# Patient Record
Sex: MALE | Race: WHITE | Employment: UNEMPLOYED | URBAN - METROPOLITAN AREA
[De-identification: names, ages, dates, MRNs, and addresses within clinical notes are randomized per-mention and may not be internally consistent; named-entity substitution may affect disease eponyms.]

---

## 2019-02-01 ENCOUNTER — OFFICE VISIT (OUTPATIENT)
Dept: NEUROSURGERY | Age: 44
End: 2019-02-01
Payer: COMMERCIAL

## 2019-02-01 VITALS
SYSTOLIC BLOOD PRESSURE: 142 MMHG | DIASTOLIC BLOOD PRESSURE: 87 MMHG | HEIGHT: 71 IN | HEART RATE: 92 BPM | WEIGHT: 237 LBS | BODY MASS INDEX: 33.18 KG/M2

## 2019-02-01 DIAGNOSIS — M54.41 CHRONIC MIDLINE LOW BACK PAIN WITH BILATERAL SCIATICA: Primary | ICD-10-CM

## 2019-02-01 DIAGNOSIS — G89.29 CHRONIC MIDLINE LOW BACK PAIN WITH BILATERAL SCIATICA: Primary | ICD-10-CM

## 2019-02-01 DIAGNOSIS — M54.42 CHRONIC MIDLINE LOW BACK PAIN WITH BILATERAL SCIATICA: Primary | ICD-10-CM

## 2019-02-01 PROCEDURE — 99203 OFFICE O/P NEW LOW 30 MIN: CPT | Performed by: NEUROLOGICAL SURGERY

## 2019-02-01 ASSESSMENT — ENCOUNTER SYMPTOMS
EYES NEGATIVE: 1
RESPIRATORY NEGATIVE: 1
BOWEL INCONTINENCE: 0
BACK PAIN: 1
GASTROINTESTINAL NEGATIVE: 1
ABDOMINAL PAIN: 0

## 2019-02-27 ENCOUNTER — OFFICE VISIT (OUTPATIENT)
Dept: PAIN MANAGEMENT | Age: 44
End: 2019-02-27
Payer: COMMERCIAL

## 2019-02-27 ENCOUNTER — PREP FOR PROCEDURE (OUTPATIENT)
Dept: PAIN MANAGEMENT | Age: 44
End: 2019-02-27

## 2019-02-27 VITALS
RESPIRATION RATE: 16 BRPM | WEIGHT: 225 LBS | DIASTOLIC BLOOD PRESSURE: 90 MMHG | HEIGHT: 71 IN | BODY MASS INDEX: 31.5 KG/M2 | HEART RATE: 88 BPM | SYSTOLIC BLOOD PRESSURE: 128 MMHG | OXYGEN SATURATION: 98 %

## 2019-02-27 DIAGNOSIS — M43.16 SPONDYLOLISTHESIS OF LUMBAR REGION: Primary | ICD-10-CM

## 2019-02-27 PROCEDURE — 99204 OFFICE O/P NEW MOD 45 MIN: CPT | Performed by: PAIN MEDICINE

## 2019-03-21 ENCOUNTER — HOSPITAL ENCOUNTER (OUTPATIENT)
Dept: OPERATING ROOM | Age: 44
Discharge: HOME | End: 2019-03-21
Payer: COMMERCIAL

## 2019-03-21 ENCOUNTER — HOSPITAL ENCOUNTER (OUTPATIENT)
Age: 44
Setting detail: OUTPATIENT SURGERY
Discharge: HOME | End: 2019-03-21
Attending: PAIN MEDICINE | Admitting: PAIN MEDICINE
Payer: COMMERCIAL

## 2019-03-21 VITALS
HEART RATE: 76 BPM | OXYGEN SATURATION: 98 % | DIASTOLIC BLOOD PRESSURE: 86 MMHG | RESPIRATION RATE: 14 BRPM | SYSTOLIC BLOOD PRESSURE: 148 MMHG

## 2019-03-21 DIAGNOSIS — M51.36 DDD (DEGENERATIVE DISC DISEASE), LUMBAR: ICD-10-CM

## 2019-03-21 PROCEDURE — 3209999900 FLUORO FOR SURGICAL PROCEDURES

## 2019-03-21 PROCEDURE — 2500000003 HC RX 250 WO HCPCS: Performed by: PAIN MEDICINE

## 2019-03-21 PROCEDURE — 64484 NJX AA&/STRD TFRM EPI L/S EA: CPT | Performed by: PAIN MEDICINE

## 2019-03-21 PROCEDURE — 6360000002 HC RX W HCPCS: Performed by: PAIN MEDICINE

## 2019-03-21 PROCEDURE — 6360000004 HC RX CONTRAST MEDICATION: Performed by: PAIN MEDICINE

## 2019-03-21 PROCEDURE — 3600000005 HC SURGERY LEVEL 5 BASE: Performed by: PAIN MEDICINE

## 2019-03-21 PROCEDURE — 7100000010 HC PHASE II RECOVERY - FIRST 15 MIN: Performed by: PAIN MEDICINE

## 2019-03-21 PROCEDURE — 2709999900 HC NON-CHARGEABLE SUPPLY: Performed by: PAIN MEDICINE

## 2019-03-21 PROCEDURE — 64483 NJX AA&/STRD TFRM EPI L/S 1: CPT | Performed by: PAIN MEDICINE

## 2019-03-21 RX ORDER — LIDOCAINE HYDROCHLORIDE 5 MG/ML
INJECTION, SOLUTION INFILTRATION; INTRAVENOUS PRN
Status: DISCONTINUED | OUTPATIENT
Start: 2019-03-21 | End: 2019-03-21 | Stop reason: ALTCHOICE

## 2019-03-21 ASSESSMENT — PAIN SCALES - GENERAL
PAINLEVEL_OUTOF10: 0
PAINLEVEL_OUTOF10: 0

## 2019-03-21 ASSESSMENT — PAIN - FUNCTIONAL ASSESSMENT: PAIN_FUNCTIONAL_ASSESSMENT: 0-10

## 2019-04-01 ENCOUNTER — PREP FOR PROCEDURE (OUTPATIENT)
Dept: PAIN MANAGEMENT | Age: 44
End: 2019-04-01

## 2019-04-01 ENCOUNTER — OFFICE VISIT (OUTPATIENT)
Dept: PAIN MANAGEMENT | Age: 44
End: 2019-04-01
Payer: COMMERCIAL

## 2019-04-01 VITALS
WEIGHT: 225 LBS | TEMPERATURE: 97.6 F | RESPIRATION RATE: 16 BRPM | DIASTOLIC BLOOD PRESSURE: 80 MMHG | HEIGHT: 71 IN | OXYGEN SATURATION: 97 % | BODY MASS INDEX: 31.5 KG/M2 | HEART RATE: 78 BPM | SYSTOLIC BLOOD PRESSURE: 120 MMHG

## 2019-04-01 DIAGNOSIS — M43.16 SPONDYLOLISTHESIS OF LUMBAR REGION: Primary | ICD-10-CM

## 2019-04-01 PROCEDURE — 99213 OFFICE O/P EST LOW 20 MIN: CPT | Performed by: PAIN MEDICINE

## 2019-04-01 NOTE — PROGRESS NOTES
Rose Melendez presents to the [facility] on 4/1/2019. China Stuart is complaining of pain lower lumbar The pain is constant. The pain is described as aching and throbbing. Pain is rated on his best day at a 0, on his worst day at a 7, and on average at a 5 on the VAS scale. He took his last dose of none. Any procedures since your last visit: Yes, with 80 % relief first week and then 50% now over time     He has not been on anticoagulation medications to include none and is managed by none. Pacemaker or defibrilator: No Physician managing device is none.        /80   Pulse 78   Temp 97.6 °F (36.4 °C) (Oral)   Resp 16   Ht 5' 11\" (1.803 m)   Wt 225 lb (102.1 kg)   SpO2 97%   BMI 31.38 kg/m²

## 2019-04-01 NOTE — PROGRESS NOTES
[address]  [address] [facility], [address] [place] [address] Linus  [phone]    Follow up Note      Presley Andrewgh     Date of Visit:  4/1/2019    CC:  Patient presents for follow up   Chief Complaint   Patient presents with    Follow-up     lower lumbar region     HPI:    Pain is better. Change in quality of symptoms:no. Medication side effects:not applicable . Recent diagnostic testing:none. Recent interventional procedures:Left L5 and S1 TFESI .fair. He has not been on anticoagulation medications to include none and has not been on herbal supplements. He is not diabetic.     Imaging: Lumbar spine MRI 01//2018    EMG 05/2018  Left L5 radiculopathy     Previous treatments: medications. .         Potential Aberrant Drug-Related Behavior:    No    Urine Drug Screening:  None no opioids started     OARRS report:  03/2019 consistent     No past medical history on file.     Past Surgical History:   Procedure Laterality Date    ANESTHESIA NERVE BLOCK Left 3/21/2019    LEFT L5 AND S1 TRANSFORAMINAL EPIDURAL STEROID INJECTION performed by Emeterio Lovelace MD at [facility] 97      thumb    KNEE ARTHROSCOPY      NERVE BLOCK Left 03/21/2019    lumbar transforaminal epidural     Prior to Admission medications    Not on File     No Known Allergies    Social History     Socioeconomic History    Marital status: Single     Spouse name: Not on file    Number of children: Not on file    Years of education: Not on file    Highest education level: Not on file   Occupational History    Not on file   Social Needs    Financial resource strain: Not on file    Food insecurity:     Worry: Not on file     Inability: Not on file    Transportation needs:     Medical: Not on file     Non-medical: Not on file   Tobacco Use    Smoking status: Never Smoker    Smokeless tobacco: Never Used   Substance and Sexual Activity    Alcohol use: No    Drug use: No    Sexual activity: Not on file   Lifestyle    Physical activity:     Days per week: Not on file     Minutes per session: Not on file    Stress: Not on file   Relationships    Social connections:     Talks on phone: Not on file     Gets together: Not on file     Attends Sikhism service: Not on file     Active member of club or organization: Not on file     Attends meetings of clubs or organizations: Not on file     Relationship status: Not on file    Intimate partner violence:     Fear of current or ex partner: Not on file     Emotionally abused: Not on file     Physically abused: Not on file     Forced sexual activity: Not on file   Other Topics Concern    Not on file   Social History Narrative    Not on file     Family History   Problem Relation Age of Onset    Cancer Father     Coronary Art Dis Father     Hypertension Father      REVIEW OF SYSTEMS:     Isidro Rho denies fever/chills, chest pain, shortness of breath, new bowel or bladder complaints. All other review of systems was negative. PHYSICAL EXAMINATION:      /80   Pulse 78   Temp 97.6 °F (36.4 °C) (Oral)   Resp 16   Ht 5' 11\" (1.803 m)   Wt 225 lb (102.1 kg)   SpO2 97%   BMI 31.38 kg/m²     General:       General appearance:   pleasant and well-hydrated. , in mild to moderate discomfort and A & O x3  Build:Normal Weight  Function:Rises from a seated position easily.     HEENT:     Head:normocephalic and atraumatic  Pupils:regular, round and equal.  Sclera: icterus absent,      Lungs:     Breathing:Breathing Pattern: regular, no distress     Abdomen:     Shape:non-distended and normal  Tenderness:none  Guarding:none     Lumbar spine:     Spine inspection:normal   CVA tenderness:No   Palpation:tenderness paravertebral muscles, facet loading, left, right and negative.   Range of motion:abnormal mildly Lateral bending, flexion, extension rotation bilateral and is  painful.     Musculoskeletal:     Trigger points in Paraveteral:absent bilaterally  SI joint tenderness:negative right, negative left              KRISTAL test:not done right, not done             left  Piriformis tenderness:negative right, negative left  Trochanteric bursa tenderness:negative right, negative left  SLR:negative right, negative left, sitting      Extremities:     Tremors:None bilaterally upper and lower  Range of motion pain with internal rotation of hips negative. Intact:Yes  Edema:Normal     Neurological:     Sensory:diminished to light touch Left L5 dermatome   Motor:               Right Quadriceps5/5          Left Quadriceps5/5           Right Gastrocnemius5/5    Left Gastrocnemius5/5  Right Ant Tibialis5/5  Left Ant Tibialis5/5  Reflexes:    Right Quadriceps reflex2+  Left Quadriceps reflex2+  Right Achilles reflex2+  Left Achilles reflex2+  Gait:antalgic     Dermatology:     Skin:no unusual rashes and no skin lesions     Impression:  Chronic low back pain with radiation to the Left lower extremity along the Left L5 dermatome   Patient had seen Leo Meraz and is a candidate for surgery if conservative treatment fails (L5-S1 posterior interbody fusion)  Lumbar spine MRI 01/2018  Grade 1 anterolisthesis with moderate to severe foraminal stenosis   Plan:  Follow up on his low back and Left leg pain   Patient is s/p Left L5 and S1 TFESI with fair response, discussed repeating, patient agrees  Will consider low dose Gabapentin if needed later  Hold off on urine screen, no opioids started   OARRS report reviewed  Patient encouraged to stay active, patient is scheduled to start physical therapy  Treatment plan discussed with the patient including medications and procedure side effects          We discussed with the patient that combining opioids, benzodiazepines, alcohol, illicit drugs or sleep aids increases the risk of respiratory depression including death.  We discussed that these medications may cause drowsiness, sedation or dizziness and have counseled the patient not to drive or operate machinery. We have discussed that these medications will be prescribed only by one provider. We have discussed with the patient about age related risk factors and have thoroughly discussed the importance of taking these medications as prescribed. The patient verbalizes understanding. gisell Steve M.D.

## 2019-04-04 ENCOUNTER — EVALUATION (OUTPATIENT)
Dept: PHYSICAL THERAPY | Age: 44
End: 2019-04-04
Payer: COMMERCIAL

## 2019-04-04 DIAGNOSIS — M43.16 SPONDYLOLISTHESIS OF LUMBAR REGION: Primary | ICD-10-CM

## 2019-04-04 PROCEDURE — 97163 PT EVAL HIGH COMPLEX 45 MIN: CPT | Performed by: PHYSICAL THERAPIST

## 2019-04-04 NOTE — PROGRESS NOTES
[facility] OUTPATIENT REHABILITATION  PHYSICAL THERAPY INITIAL EVALUATION         Date:  2019   Patient: Charisse Lizarraga  : 1975  MRN: [id number]  Referring Provider: Earlene Diallo MD  [address] [place] [address], [address] [place] [address], [address] [place] [address] Jackelyn S     Medical Diagnosis:   M54.41 (ICD-10-CM) - Lumbago with sciatica, right side   M54.42 (ICD-10-CM) - Lumbago with sciatica, left side   G89.29 (ICD-10-CM) - Other chronic pain     Onset date: 2017 2 episodes lifting that resolved in 2 days then 10/25/2017 episode with bob that preceded this current course of pain    Onset[de-identified] Sudden onset    Mechanism of Injury: pulling a bob up stairs with food felt a pop and pain    Chief complaint: pain across LB, bilateral buttocks and down posterior L leg into foot     SUBJECTIVE:     Pat Medical History  No past medical history on file. Past Surgical History:   Procedure Laterality Date    ANESTHESIA NERVE BLOCK Left 3/21/2019    LEFT L5 AND S1 TRANSFORAMINAL EPIDURAL STEROID INJECTION performed by Nathalie Ambrose MD at [facility] 97      thumb    KNEE ARTHROSCOPY      NERVE BLOCK Left 2019    lumbar transforaminal epidural       Medications:   No current outpatient medications on file. No current facility-administered medications for this visit. Imaging results: Fluoro For Surgical Procedures    Result Date: 3/21/2019  Radiology exam is complete. No Radiologist dictation. Please follow up with ordering provider.        Pain: constant in LB, intermittent L LE  Current: 4/10     Best: 1/10     Worst:7/10    Symptom Type/Quality: sharp, throbbing  Location[de-identified] Back: lumbar region, right lateral side and left lateral side radiates down the posterior left leg     70% LBP and 30% L LE pain    Behavior: condition is staying the same      Provoking Activities/Positions:  [x] Sitting  [] Standing  [] Walking [x] Side Lying  [] Bending  [] When still                                                                     [] On the move  [] Turning head  [] A.M.                                                                     [] P.M.  [] As day progresses [] Cough                [] Sneezing                 Relieving Activitie/Positions:      [] Sitting  [] Standing  [x] Walking                                                                     [] Lying  [] Bending  [] When still                                                                     [] On the move  [] Turning head  [] A.M.                                                                     [] P.M.  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 Physical demands include: Heavy Lifting. Status: Full Time     Exercise regimen: none    Hobbies: golf     Patient Goals: avoid surgery    Medical Management for Current Problem:  [] Chiro  []  CT  []  Injection:    [x]  Meds:   [x]  MRI:  [x]  Neurosurgery  [x]  Pain clinic  []  PM&R  [x]  PT/OT [facility]    []  X-ray:  []  Other:     Contraindications/Precautions: none    OBJECTIVE:     Inspection:  Standing    Cervical: Forward Head   [x] Normal   []Mild   [] Moderate   []Severe      Thoracic:         [x] Normal   [] Increased Kyphosis  [] Decreased Kyphosis    Lumbar:   [x] Normal   [] Increased Lordosis   [] Decreased Lordosis           Observations: well nourished male with normal affect     Gait:  Normal    Functional Strength:   Able to toe walk, heel walk, and squat.     Range of Motion:    Trunk:   Flexion:  [x] Normal   [] Limited    Extension:  [] Normal   [x] Limited 25% stiff    Right Side Bending: [x] Normal   [] Limited stiff and sore   Left Side Bending: [x] Normal   [] Limited stiff and sore    Lower

## 2019-04-05 ENCOUNTER — TREATMENT (OUTPATIENT)
Dept: PHYSICAL THERAPY | Age: 44
End: 2019-04-05
Payer: COMMERCIAL

## 2019-04-05 DIAGNOSIS — M43.16 SPONDYLOLISTHESIS OF LUMBAR REGION: Primary | ICD-10-CM

## 2019-04-05 PROCEDURE — 97530 THERAPEUTIC ACTIVITIES: CPT

## 2019-04-05 PROCEDURE — G0283 ELEC STIM OTHER THAN WOUND: HCPCS

## 2019-04-05 PROCEDURE — 97110 THERAPEUTIC EXERCISES: CPT

## 2019-04-05 NOTE — PROGRESS NOTES
Physical Therapy Treatment Note    Date: 2019  Patient Name: Ama Amato  : 1975   MRN: [id number]  Referring Provider: Mary Geller MD  [address] [facility], [facility], [address] [facility] [address]                             Medical Diagnosis:   M54.41 (ICD-10-CM) - Lumbago with sciatica, right side   M54.42 (ICD-10-CM) - Lumbago with sciatica, left side   G89.29 (ICD-10-CM) - Other chronic pain      Onset date: 2017 2 episodes lifting that resolved in 2 days then 10/25/2017 episode with bob that preceded this current course of pain  Onset[de-identified] Sudden onset  Mechanism of Injury: pulling a bob up stairs with food felt a pop and pain  Chief complaint: pain across LB, bilateral buttocks and down posterior L leg into foot    Outcome Measure:      S: Pt reports 5/10 pain today. However last night pain was 8/10 with shooting pain down Left LE, did not sleep well secondary to pain. O:  Time 2591-3031     Visit  18 visits approved, Oswestry at 9 and 18    Pain 3/10     ROM      Modalities      MH + ES 20 min           Exercise      ALL EXERCISE DONE WITH DRAW-IN TECHNIQUE                            Functional activities To aid in reaching , pushing, pulling tasks at home     ROWS: H 15# x 20  TA   ROWS: M 15# x 20  TA   ROWS: L 15# x 20  TA   Punches Blue x 20  TA   Obliques - high      Obliques - low       THEREX     Nustep   L5 x  10 min  TE   Punches      Lat pulldowns      Triceps ext standing      Marching            Trunk ext TB      Trunk flex TB      Hip abd      Hip EXT      TG Squats                  A:  Tolerated well.     P: Continue with rehab plan  Jeff Pires ATC    Treatment Charges: Mins Units   Initial Evaluation     Re-Evaluation     Ther Exercise         TE 10 1   Manual Therapy     MT     Ther Activities        TA 20 1   Gait Training          GT     Neuro Re-education NR     Modalities 20 1   Non-Billable Service Time     Other     Total Time/Units 50 3

## 2019-04-10 ENCOUNTER — TREATMENT (OUTPATIENT)
Dept: PHYSICAL THERAPY | Age: 44
End: 2019-04-10
Payer: COMMERCIAL

## 2019-04-10 DIAGNOSIS — M43.16 SPONDYLOLISTHESIS OF LUMBAR REGION: Primary | ICD-10-CM

## 2019-04-10 PROCEDURE — G0283 ELEC STIM OTHER THAN WOUND: HCPCS

## 2019-04-10 PROCEDURE — 97530 THERAPEUTIC ACTIVITIES: CPT

## 2019-04-10 PROCEDURE — 97110 THERAPEUTIC EXERCISES: CPT

## 2019-04-10 NOTE — PROGRESS NOTES
Physical Therapy Treatment Note    Date: 4/10/2019  Patient Name: Randa Israel  : 1975   MRN: [id number]  Referring Provider: Kandy Miranda MD  [address] [place] [address], [address] [place] [address], [facility] [place]                             Medical Diagnosis:   M54.41 (ICD-10-CM) - Lumbago with sciatica, right side   M54.42 (ICD-10-CM) - Lumbago with sciatica, left side   G89.29 (ICD-10-CM) - Other chronic pain      Onset date: 2017 2 episodes lifting that resolved in 2 days then 10/25/2017 episode with bob that preceded this current course of pain  Onset[de-identified] Sudden onset  Mechanism of Injury: pulling a bob up stairs with food felt a pop and pain  Chief complaint: pain across LB, bilateral buttocks and down posterior L leg into foot    Outcome Measure:      S: Pt reports 3/10 pain today. States he felt good following last session. O:  Time 4232-9054     Visit 3/18 18 visits approved, Oswestry at 9 and 18    Pain 3/10     ROM      Modalities      MH + ES 20 min           Exercise      ALL EXERCISE DONE WITH DRAW-IN TECHNIQUE                            Functional activities To aid in reaching , pushing, pulling tasks at home     ROWS: H 15# 2 x 20  TA   ROWS: M 15# 2 x 20  TA   ROWS: L 15# 2 x 20  TA   Punches Blue 2 x 20  TA   Obliques - high      Obliques - low       THEREX     Nustep   L5 x  10 min  TE   Punches      Lat pulldowns      Triceps ext standing      Marching            Trunk ext TB      Trunk flex TB      Hip abd      Hip EXT      TG Squats                  A:  Tolerated well.     P: Continue with rehab plan  Mildred Bower ATC    Treatment Charges: Mins Units   Initial Evaluation     Re-Evaluation     Ther Exercise         TE 10 1   Manual Therapy     MT     Ther Activities        TA 20 1   Gait Training          GT     Neuro Re-education NR     Modalities 20 1   Non-Billable Service Time     Other     Total Time/Units 50 3

## 2019-04-12 ENCOUNTER — TREATMENT (OUTPATIENT)
Dept: PHYSICAL THERAPY | Age: 44
End: 2019-04-12
Payer: COMMERCIAL

## 2019-04-12 DIAGNOSIS — M43.16 SPONDYLOLISTHESIS OF LUMBAR REGION: Primary | ICD-10-CM

## 2019-04-12 PROCEDURE — 97530 THERAPEUTIC ACTIVITIES: CPT

## 2019-04-12 PROCEDURE — 97110 THERAPEUTIC EXERCISES: CPT

## 2019-04-15 ENCOUNTER — TREATMENT (OUTPATIENT)
Dept: PHYSICAL THERAPY | Age: 44
End: 2019-04-15
Payer: COMMERCIAL

## 2019-04-15 DIAGNOSIS — M43.16 SPONDYLOLISTHESIS OF LUMBAR REGION: Primary | ICD-10-CM

## 2019-04-15 PROCEDURE — 97530 THERAPEUTIC ACTIVITIES: CPT

## 2019-04-15 PROCEDURE — 97110 THERAPEUTIC EXERCISES: CPT

## 2019-04-15 NOTE — PROGRESS NOTES
Physical Therapy Treatment Note    Date: 4/15/2019  Patient Name: Vince Polanco  : 1975   MRN: [id number]  Referring Provider: Lachelle Modi MD  [address] [place] [address], [address] [place] [address], [facility] [place] Jackelyn S                             Medical Diagnosis:   M54.41 (ICD-10-CM) - Lumbago with sciatica, right side   M54.42 (ICD-10-CM) - Lumbago with sciatica, left side   G89.29 (ICD-10-CM) - Other chronic pain      Onset date: 2017 2 episodes lifting that resolved in 2 days then 10/25/2017 episode with bob that preceded this current course of pain  Onset[de-identified] Sudden onset  Mechanism of Injury: pulling a bob up stairs with food felt a pop and pain  Chief complaint: pain across LB, bilateral buttocks and down posterior L leg into foot    Outcome Measure:      S: Pt states L side pain has subsided and pain in L LE has decreased in frequency. Felt some tightness after walking on treadmill. O:  Time 3017-9376     Visit  18 visits approved, Oswestry at 9 and 18    Pain 3/10     ROM      Modalities      MH + ES            Exercise      ALL EXERCISE DONE WITH DRAW-IN TECHNIQUE                            Functional activities To aid in reaching , pushing, pulling tasks at home     ROWS: H 15# 2 x 20  TA   ROWS: M 15# 2 x 20  TA   ROWS: L 15# 2 x 20  TA   Punches Blue 2 x 20  TA   Obliques - high Grey x 20 ea dir  TA   Obliques - low Grey x 20 ea dir  TA    THEREX     Nustep   L5 x 10 min  TE   Treadmill X 10 min  TE   Punches      Lat pulldowns      Triceps ext standing      Marching      Alt DB press 8# 2 x 20  TE   Trunk ext TB      Trunk flex TB      Hip abd      Hip EXT      TG Squats                  A:  Tolerated well. Pt able to increase activity today.   P: Continue with rehab plan  Edvin Rodriguez ATC    Treatment Charges: Mins Units   Initial Evaluation     Re-Evaluation     Ther Exercise         TE 25 2   Manual Therapy     MT     Ther Activities        TA 35 2   Gait Training          GT     Neuro Re-education

## 2019-04-16 ENCOUNTER — PREP FOR PROCEDURE (OUTPATIENT)
Dept: PAIN MANAGEMENT | Age: 44
End: 2019-04-16

## 2019-04-17 ENCOUNTER — TREATMENT (OUTPATIENT)
Dept: PHYSICAL THERAPY | Age: 44
End: 2019-04-17
Payer: COMMERCIAL

## 2019-04-17 DIAGNOSIS — M43.16 SPONDYLOLISTHESIS OF LUMBAR REGION: Primary | ICD-10-CM

## 2019-04-17 PROCEDURE — 97110 THERAPEUTIC EXERCISES: CPT

## 2019-04-17 PROCEDURE — 97530 THERAPEUTIC ACTIVITIES: CPT

## 2019-04-18 ENCOUNTER — HOSPITAL ENCOUNTER (OUTPATIENT)
Dept: OPERATING ROOM | Age: 44
Discharge: HOME | End: 2019-04-18
Payer: COMMERCIAL

## 2019-04-18 ENCOUNTER — HOSPITAL ENCOUNTER (OUTPATIENT)
Age: 44
Setting detail: OUTPATIENT SURGERY
Discharge: HOME | End: 2019-04-18
Attending: PAIN MEDICINE | Admitting: PAIN MEDICINE
Payer: COMMERCIAL

## 2019-04-18 VITALS
TEMPERATURE: 98 F | OXYGEN SATURATION: 97 % | SYSTOLIC BLOOD PRESSURE: 135 MMHG | HEART RATE: 75 BPM | DIASTOLIC BLOOD PRESSURE: 80 MMHG | RESPIRATION RATE: 16 BRPM

## 2019-04-18 DIAGNOSIS — M54.16 LUMBAR RADICULOPATHY: ICD-10-CM

## 2019-04-18 PROCEDURE — 2709999900 HC NON-CHARGEABLE SUPPLY: Performed by: PAIN MEDICINE

## 2019-04-18 PROCEDURE — 6360000002 HC RX W HCPCS: Performed by: PAIN MEDICINE

## 2019-04-18 PROCEDURE — 7100000010 HC PHASE II RECOVERY - FIRST 15 MIN: Performed by: PAIN MEDICINE

## 2019-04-18 PROCEDURE — 2500000003 HC RX 250 WO HCPCS: Performed by: PAIN MEDICINE

## 2019-04-18 PROCEDURE — 64484 NJX AA&/STRD TFRM EPI L/S EA: CPT | Performed by: PAIN MEDICINE

## 2019-04-18 PROCEDURE — 3209999900 FLUORO FOR SURGICAL PROCEDURES

## 2019-04-18 PROCEDURE — 6360000004 HC RX CONTRAST MEDICATION: Performed by: PAIN MEDICINE

## 2019-04-18 PROCEDURE — 64483 NJX AA&/STRD TFRM EPI L/S 1: CPT | Performed by: PAIN MEDICINE

## 2019-04-18 PROCEDURE — 3600000015 HC SURGERY LEVEL 5 ADDTL 15MIN: Performed by: PAIN MEDICINE

## 2019-04-18 PROCEDURE — 3600000005 HC SURGERY LEVEL 5 BASE: Performed by: PAIN MEDICINE

## 2019-04-18 RX ORDER — LIDOCAINE HYDROCHLORIDE 5 MG/ML
INJECTION, SOLUTION INFILTRATION; INTRAVENOUS PRN
Status: DISCONTINUED | OUTPATIENT
Start: 2019-04-18 | End: 2019-04-18 | Stop reason: ALTCHOICE

## 2019-04-18 ASSESSMENT — PAIN SCALES - GENERAL: PAINLEVEL_OUTOF10: 0

## 2019-04-18 ASSESSMENT — PAIN - FUNCTIONAL ASSESSMENT: PAIN_FUNCTIONAL_ASSESSMENT: 0-10

## 2019-04-18 NOTE — OP NOTE
foramen was entered . A lateral fluoroscopic view was then used to place the needle tip in the middle of the foramen. Negative aspiration was confirmed for blood and CSF and 0.5 cc of Omnipaque 240 contrast was injected at each level under live fluoroscopy. Appropriate neurograms were observed under AP fluoroscopy. Then after negative aspiration, a solution of the 2 cc of 0.5% lidocaine and 80 mg DepoMedrol was easily injected at each level. The needles were removed with constant aspiration technique. The patient back was cleaned and a bandage was placed over the needle insertion points    Disposition the patient tolerated the procedure well and there were no complications . Vital signs remained stable throughout the procedure. The patient was escorted to the recovery area where they remained until discharge and written discharge instructions for the procedure were given. Plan: Ruth Garcia will return to our pain management center as scheduled.      Joseph Chavez MD

## 2019-04-18 NOTE — H&P
[address]  [address] [facility], [address] [place] [address] [place]  [phone]    Procedure History & Physical      Perrymika Ramireztoshia     HPI:    Patient  is here for low back and Left leg pain for Left L5 and S1 TFESI  Labs/imaging studies reviewed   All question and concerns addressed including R/B/A associated with the procedure    No past medical history on file.     Past Surgical History:   Procedure Laterality Date    ANESTHESIA NERVE BLOCK Left 3/21/2019    LEFT L5 AND S1 TRANSFORAMINAL EPIDURAL STEROID INJECTION performed by Rupesh Chan MD at [facility] 97      thumb    KNEE ARTHROSCOPY      NERVE BLOCK Left 03/21/2019    lumbar transforaminal epidural       Prior to Admission medications    Not on File       No Known Allergies    Social History     Socioeconomic History    Marital status: Single     Spouse name: Not on file    Number of children: Not on file    Years of education: Not on file    Highest education level: Not on file   Occupational History    Not on file   Social Needs    Financial resource strain: Not on file    Food insecurity:     Worry: Not on file     Inability: Not on file    Transportation needs:     Medical: Not on file     Non-medical: Not on file   Tobacco Use    Smoking status: Never Smoker    Smokeless tobacco: Never Used   Substance and Sexual Activity    Alcohol use: No    Drug use: No    Sexual activity: Not on file   Lifestyle    Physical activity:     Days per week: Not on file     Minutes per session: Not on file    Stress: Not on file   Relationships    Social connections:     Talks on phone: Not on file     Gets together: Not on file     Attends Confucianist service: Not on file     Active member of club or organization: Not on file     Attends meetings of clubs or organizations: Not on file     Relationship status: Not on file    Intimate partner violence:     Fear of current or ex partner: Not on file     Emotionally abused: Not on file     Physically abused: Not on file     Forced sexual activity: Not on file   Other Topics Concern    Not on file   Social History Narrative    Not on file       Family History   Problem Relation Age of Onset    Cancer Father     Coronary Art Dis Father     Hypertension Father          REVIEW OF SYSTEMS:    CONSTITUTIONAL:  negative for  fevers, chills, sweats and fatigue    RESPIRATORY:  negative for  dry cough, cough with sputum, dyspnea, wheezing and chest pain    CARDIOVASCULAR:  negative for chest pain, dyspnea, palpitations, syncope    GASTROINTESTINAL:  negative for nausea, vomiting, change in bowel habits, diarrhea, constipation and abdominal pain    MUSCULOSKELETAL: negative for muscle weakness    SKIN: negative for itching or rashes. BEHAVIOR/PSYCH:  negative for poor appetite, increased appetite, decreased sleep and poor concentration    All other systems negative      PHYSICAL EXAM:    VITALS:  /77   Pulse 90   Temp 98 °F (36.7 °C)   Resp 16   SpO2 96%     CONSTITUTIONAL:  awake, alert, cooperative, no apparent distress, and appears stated age    EYES: PERRLA, EOMI    LUNGS:  No increased work of breathing, no audible wheezing    CARDIOVASCULAR:  regular rate and rhythm    ABDOMEN:  Soft non tender non distended     EXTREMITIES: no signs of clubbing or cyanosis. MUSCULOSKELETAL: negative for flaccid muscle tone or spastic movements. SKIN: gross examination reveals no signs of rashes, or diaphoresis. NEURO: Cranial nerves II-XII grossly intact. No signs of agitated mood.        Assessment/Plan:    Low back and Left Leg  pain for Left L5 and S1 TFESI

## 2019-04-25 ENCOUNTER — TREATMENT (OUTPATIENT)
Dept: PHYSICAL THERAPY | Age: 44
End: 2019-04-25
Payer: COMMERCIAL

## 2019-04-25 DIAGNOSIS — M43.16 SPONDYLOLISTHESIS OF LUMBAR REGION: Primary | ICD-10-CM

## 2019-04-25 PROCEDURE — 97110 THERAPEUTIC EXERCISES: CPT

## 2019-04-25 PROCEDURE — 97530 THERAPEUTIC ACTIVITIES: CPT

## 2019-04-25 PROCEDURE — 97112 NEUROMUSCULAR REEDUCATION: CPT

## 2019-04-25 NOTE — PROGRESS NOTES
Physical Therapy Treatment Note    Date: 2019  Patient Name: Rose Melendez  : 1975   MRN: [id number]  Referring Provider: Mark Hay MD  [address] [place] [address], [address] [place] [address], [address] [place] [address] Jackelyn S                             Medical Diagnosis:   M54.41 (ICD-10-CM) - Lumbago with sciatica, right side   M54.42 (ICD-10-CM) - Lumbago with sciatica, left side   G89.29 (ICD-10-CM) - Other chronic pain      Onset date: 2017 2 episodes lifting that resolved in 2 days then 10/25/2017 episode with bob that preceded this current course of pain  Onset[de-identified] Sudden onset  Mechanism of Injury: pulling a bob up stairs with food felt a pop and pain  Chief complaint: pain across LB, bilateral buttocks and down posterior L leg into foot    Outcome Measure:      S: Pt reports back feels better following second epidural.    O:  Time 4535-7816     Visit  18 visits approved, Oswestry at 9 and 18    Pain 3/10     ROM      Modalities      MH            Exercise      ALL EXERCISE DONE WITH DRAW-IN TECHNIQUE                            Functional activities To aid in reaching , pushing, pulling tasks at home     ROWS: H 15# 2 x 20  TA   ROWS: M 15# 2 x 20  TA   ROWS: L 15# 2 x 20  TA   Punches Blue 2 x 20  TA   Obliques - high Grey x 20 ea dir  TA   Obliques - low Grey x 20 ea dir  TA    THEREX     Nustep     TE   Treadmill 2 X 10 min  Before and after exercise TE   Punches      Lawnmower pulls 10# x 20 ea side  NR   Lat pulldowns      Triceps ext standing      Marching 2 x 45ft  NR   Side stepping 2 x 45ft  NR   Alt DB press 8# 2 x 20  TE   squats X 20  TE   Trunk ext TB      Trunk flex TB      Hip abd      Hip EXT      TG Squats      Leg Press 60# 2 x 20  TE         A:  Tolerated well. Large, functional, dynamic, global movements used to build strength, balance, endurance, and flexibility and to improve physical performance. Feedback and cues necessary for developing neuromuscular control.   Movement education and guided movement interventions such as balance, stability used to improve performance and control.   P: Continue with rehab plan  Rebecca Quezada ATC    Treatment Charges: Mins Units   Initial Evaluation     Re-Evaluation     Ther Exercise         TE 25 1   Manual Therapy     MT     Ther Activities        TA 35 2   Gait Training          GT     Neuro Re-education NR 10 1   Modalities     Non-Billable Service Time  0   Other     Total Time/Units 70 4

## 2019-04-26 ENCOUNTER — TREATMENT (OUTPATIENT)
Dept: PHYSICAL THERAPY | Age: 44
End: 2019-04-26
Payer: COMMERCIAL

## 2019-04-26 DIAGNOSIS — M43.16 SPONDYLOLISTHESIS OF LUMBAR REGION: Primary | ICD-10-CM

## 2019-04-26 PROCEDURE — 97530 THERAPEUTIC ACTIVITIES: CPT | Performed by: PHYSICAL THERAPIST

## 2019-04-26 PROCEDURE — 97112 NEUROMUSCULAR REEDUCATION: CPT | Performed by: PHYSICAL THERAPIST

## 2019-04-26 PROCEDURE — 97110 THERAPEUTIC EXERCISES: CPT | Performed by: PHYSICAL THERAPIST

## 2019-04-26 NOTE — PROGRESS NOTES
Physical Therapy Treatment Note    Date: 2019  Patient Name: Marky José  : 1975   MRN: [id number]  Referring Provider: Jorge Albetro Koo MD  [address] [place] [address], [address] [place] [address], [facility]                             Medical Diagnosis:   M54.41 (ICD-10-CM) - Lumbago with sciatica, right side   M54.42 (ICD-10-CM) - Lumbago with sciatica, left side   G89.29 (ICD-10-CM) - Other chronic pain      Onset date: 2017 2 episodes lifting that resolved in 2 days then 10/25/2017 episode with bob that preceded this current course of pain  Onset[de-identified] Sudden onset  Mechanism of Injury: pulling a bob up stairs with food felt a pop and pain  Chief complaint: pain across LB, bilateral buttocks and down posterior L leg into foot    Outcome Measure:      S: Pt felt some twinges but not really any pain in his LB after leaving yesterday but better today. O:  Time 7803-4167     Visit  18 visits approved, Oswestry at 9 and 18    Pain 3/10     ROM      Modalities      MH            Exercise      ALL EXERCISE DONE WITH DRAW-IN TECHNIQUE                            Functional activities To aid in reaching , pushing, pulling tasks at home     ROWS: H 15# 2 x 20  TA   ROWS: M 15# 2 x 20  TA   ROWS: L 15# 2 x 20  TA   Punches Blue 2 x 20  TA   Obliques - high Grey x 20 ea dir  TA   Obliques - low Grey x 20 ea dir  TA    THEREX     Nustep     TE   Treadmill 2 X 10 min  Before and after exercise TE   Punches      Lawnmower pulls 15# x 20 ea side  NR   Lat pulldowns      Triceps ext standing      Marching 2 x 45ft  NR   Side stepping 2 x 45ft  NR   Alt DB press 8# 2 x 20  TE   squats X 20  TE   Trunk ext TB      Trunk flex TB      Hip abd      Hip EXT      TG Squats      Leg Press 60# 2 x 20  TE         A:  Tolerated well. Large, functional, dynamic, global movements used to build strength, balance, endurance, and flexibility and to improve physical performance.  Feedback and cues necessary for developing neuromuscular control. Movement education and guided movement interventions such as balance, stability used to improve performance and control.   P: Continue with rehab plan  Emry Gist, ATC    Treatment Charges: Mins Units   Initial Evaluation     Re-Evaluation     Ther Exercise         TE 25 1   Manual Therapy     MT     Ther Activities        TA 35 2   Gait Training          GT     Neuro Re-education NR 10 1   Modalities     Non-Billable Service Time  0   Other     Total Time/Units 70 4

## 2019-04-29 ENCOUNTER — TREATMENT (OUTPATIENT)
Dept: PHYSICAL THERAPY | Age: 44
End: 2019-04-29
Payer: COMMERCIAL

## 2019-04-29 DIAGNOSIS — M43.16 SPONDYLOLISTHESIS OF LUMBAR REGION: Primary | ICD-10-CM

## 2019-04-29 PROCEDURE — 97530 THERAPEUTIC ACTIVITIES: CPT

## 2019-04-29 PROCEDURE — 97112 NEUROMUSCULAR REEDUCATION: CPT

## 2019-04-29 PROCEDURE — 97110 THERAPEUTIC EXERCISES: CPT

## 2019-04-29 NOTE — PROGRESS NOTES
Physical Therapy Treatment Note    Date: 2019  Patient Name: Milton Melvin  : 1975   MRN: [id number]  Referring Provider: Missy Boo MD  [address] [place] [address], [address] [place] [address], [address] [place] [address] Jackelyn S                             Medical Diagnosis:   M54.41 (ICD-10-CM) - Lumbago with sciatica, right side   M54.42 (ICD-10-CM) - Lumbago with sciatica, left side   G89.29 (ICD-10-CM) - Other chronic pain      Onset date: 2017 2 episodes lifting that resolved in 2 days then 10/25/2017 episode with bob that preceded this current course of pain  Onset[de-identified] Sudden onset  Mechanism of Injury: pulling a bob up stairs with food felt a pop and pain  Chief complaint: pain across LB, bilateral buttocks and down posterior L leg into foot    Outcome Measure:    Oswestry Score #9 visit:  42%  S: Pt reported L side LBP with pain into left leg while lying in bed last night. Subsided and does not have now. O:  Time 2487-8029     Visit 18 18 visits approved, Oswestry at 9 and 18    Pain 3/10     ROM      Modalities      MH            Exercise      ALL EXERCISE DONE WITH DRAW-IN TECHNIQUE                            Functional activities To aid in reaching , pushing, pulling tasks at home     ROWS: H 20# 2 x 20  TA   ROWS: M 20# 2 x 20  TA   ROWS: L 20# 2 x 20  TA   Punches Blue 2 x 20  TA   Obliques - high Grey x 20 ea dir  TA   Obliques - low Grey x 20 ea dir  TA    THEREX     Nustep     TE   Treadmill 2 X 10 min  Before and after exercise TE   Punches      Lawnmower pulls 15# x 20 ea side  NR   Lat pulldowns      Triceps ext standing      Marching 2 x 45ft  NR   Side stepping 2 x 45ft  NR   Alt DB press 8# 2 x 20  TE   squats X 20  TE   Trunk ext TB      Trunk flex TB      Hip abd      Hip EXT      TG Squats      Leg Press 60# 2 x 20  TE         A:  Tolerated well. Large, functional, dynamic, global movements used to build strength, balance, endurance, and flexibility and to improve physical performance.  Feedback and cues

## 2019-05-01 ENCOUNTER — OFFICE VISIT (OUTPATIENT)
Dept: PAIN MANAGEMENT | Age: 44
End: 2019-05-01
Payer: COMMERCIAL

## 2019-05-01 ENCOUNTER — TREATMENT (OUTPATIENT)
Dept: PHYSICAL THERAPY | Age: 44
End: 2019-05-01
Payer: COMMERCIAL

## 2019-05-01 VITALS
RESPIRATION RATE: 20 BRPM | SYSTOLIC BLOOD PRESSURE: 110 MMHG | HEART RATE: 92 BPM | DIASTOLIC BLOOD PRESSURE: 80 MMHG | TEMPERATURE: 97.9 F

## 2019-05-01 DIAGNOSIS — M43.16 SPONDYLOLISTHESIS OF LUMBAR REGION: ICD-10-CM

## 2019-05-01 DIAGNOSIS — G89.29 OTHER CHRONIC PAIN: Primary | ICD-10-CM

## 2019-05-01 PROCEDURE — 97530 THERAPEUTIC ACTIVITIES: CPT

## 2019-05-01 PROCEDURE — 97110 THERAPEUTIC EXERCISES: CPT

## 2019-05-01 PROCEDURE — 99213 OFFICE O/P EST LOW 20 MIN: CPT | Performed by: PAIN MEDICINE

## 2019-05-01 RX ORDER — GABAPENTIN 100 MG/1
200 CAPSULE ORAL 2 TIMES DAILY
Qty: 120 CAPSULE | Refills: 0 | Status: SHIPPED | OUTPATIENT
Start: 2019-05-01 | End: 2019-06-19 | Stop reason: SDUPTHER

## 2019-05-01 NOTE — PROGRESS NOTES
Physical Therapy Treatment Note    Date: 2019  Patient Name: Pranav Pat  : 1975   MRN: [id number]  Referring Provider: Edward Johnson MD  [facility], [address] [place] [address], [address]                             Medical Diagnosis:   M54.41 (ICD-10-CM) - Lumbago with sciatica, right side   M54.42 (ICD-10-CM) - Lumbago with sciatica, left side   G89.29 (ICD-10-CM) - Other chronic pain      Onset date: 2017 2 episodes lifting that resolved in 2 days then 10/25/2017 episode with bob that preceded this current course of pain  Onset[de-identified] Sudden onset  Mechanism of Injury: pulling a bob up stairs with food felt a pop and pain  Chief complaint: pain across LB, bilateral buttocks and down posterior L leg into foot    Outcome Measure:    Oswestry Score #9 visit:  42% Oswestry at 9 and 18  S: Pt reported con't pain mainly with seated and supine positions. Pt has doctor appointment after therapy. Time 14:50-15:50     Visit 10/18 18 visits approved 3/21-    Pain 3/10     ROM      Modalities      MH            Exercise      ALL EXERCISE DONE WITH DRAW-IN TECHNIQUE                            Functional activities To aid in reaching , pushing, pulling tasks at home     ROWS: H 20# 2 x 20  TA   ROWS: M 20# 2 x 20  TA   ROWS: L 20# 2 x 20  TA   Punches Blue 2 x 20  TA   Obliques - high Grey x 20 ea dir  TA   Obliques - low Grey x 20 ea dir  TA    THEREX     Nustep        Treadmill 2 X 10 min  Before and after exercise TE   Punches      Lawnmower pulls 15# x 20 ea side  NR   Lat pulldowns      Triceps ext standing      Marching 2 x 45ft  NR   Side stepping 2 x 45ft  NR   Alt DB press 8# 2 x 20  TE   squats X 20  TE   Trunk ext TB      Trunk flex TB      Hip abd      Hip EXT      TG Squats      Leg Press 60# 2 x 20  TE         A: Tolerated treatment well. Large, functional, dynamic, global movements used to build strength, balance, endurance, and flexibility and to improve physical performance.  Feedback and cues necessary for developing neuromuscular control. Movement education and guided movement interventions such as balance, stability used to improve performance and control.   P: Continue with rehab plan  Becca Felipe PTA    Treatment Charges: Mins Units   Initial Evaluation     Re-Evaluation     Ther Exercise         TE 30 2   Manual Therapy     MT     Ther Activities        TA 30 2   Gait Training          GT     Neuro Re-education NR     Modalities     Non-Billable Service Time     Other     Total Time/Units 60 4

## 2019-05-01 NOTE — PROGRESS NOTES
Diane De Leon presents to the [facility] on 5/1/2019. Juliette Avitia is complaining of pain low back and radiates down left leg to toes. The pain is intermittent. The pain is described as aching, throbbing, stabbing and burning. Pain is rated on his best day at a 2, on his worst day at a 6, and on average at a 4 on the VAS scale. He took his last dose of lidocaine patches  Applied 1 week ago. Any procedures since your last visit: Yes, with 50 % relief.     He has not been on anticoagulation medications  Pacemaker or defibrilator: No        /80   Pulse 92   Temp 97.9 °F (36.6 °C) (Oral)   Resp 20

## 2019-05-01 NOTE — PROGRESS NOTES
[address], [address] [place] [address]  [phone]    Follow up Note      Lizet Abdalla     Date of Visit:  5/1/2019    CC:  Patient presents for follow up   Chief Complaint   Patient presents with    Lower Back Pain     radiates down left leg to toes     HPI:    Pain is better. Change in quality of symptoms:no. Medication side effects:not applicable . Recent diagnostic testing:none. Recent interventional procedures:s/p repeat Left L5 and S1 TFESI moderate (50% improvement so far)    He has not been on anticoagulation medications to include none and has not been on herbal supplements. He is not diabetic.     Imaging: Lumbar spine MRI 01//2018    EMG 05/2018  Left L5 radiculopathy     Previous treatments: medications. .         Potential Aberrant Drug-Related Behavior:    No    Urine Drug Screening:  None no opioids started     OARRS report:  03/2019 consistent   05/2019 consistent     History reviewed. No pertinent past medical history. Past Surgical History:   Procedure Laterality Date    ANESTHESIA NERVE BLOCK Left 3/21/2019    LEFT L5 AND S1 TRANSFORAMINAL EPIDURAL STEROID INJECTION performed by Baron Georgette MD at [address] [place] [address] Left 4/18/2019    LEFT L5 AND S1 TRANSFORAMINAL EPIDURAL STEROID INJECTION performed by Baron Georgette MD at [place] [address]      thumb    KNEE ARTHROSCOPY      NERVE BLOCK Left 03/21/2019    lumbar transforaminal epidural    NERVE BLOCK Left 04/18/2019     Prior to Admission medications    Medication Sig Start Date End Date Taking?  Authorizing Provider   NONFORMULARY every 8 hours as needed Indications: lidocaine patches   Yes Historical Provider, MD     No Known Allergies    Social History     Socioeconomic History    Marital status: Single     Spouse name: Not on file    Number of children: Not on file    Years of education: Not on file    Highest normal  Tenderness:none  Guarding:none     Lumbar spine:     Spine inspection:normal   CVA tenderness:No   Palpation:tenderness paravertebral muscles, facet loading, left, right and negative. Range of motion:abnormal mildly Lateral bending, flexion, extension rotation bilateral and is  painful.     Musculoskeletal:     Trigger points in Paraveteral:absent bilaterally  SI joint tenderness:negative right, negative left              KRISTAL test:not done right, not done             left  Piriformis tenderness:negative right, negative left  Trochanteric bursa tenderness:negative right, negative left  SLR:negative right, negative left, sitting      Extremities:     Tremors:None bilaterally upper and lower  Range of motion pain with internal rotation of hips negative.   Intact:Yes  Edema:Normal     Neurological:     Sensory:diminished to light touch Left L5 dermatome   Motor:               Right Quadriceps5/5          Left Quadriceps5/5           Right Gastrocnemius5/5    Left Gastrocnemius5/5  Right Ant Tibialis5/5  Left Ant Tibialis5/5  Reflexes:    Right Quadriceps reflex2+  Left Quadriceps reflex2+  Right Achilles reflex2+  Left Achilles reflex2+  Gait:antalgic     Dermatology:     Skin:no unusual rashes and no skin lesions     Impression:  Chronic low back pain with radiation to the Left lower extremity along the Left L5 dermatome   Patient had seen Binta Scott and is a candidate for surgery if conservative treatment fails (L5-S1 posterior interbody fusion)  Lumbar spine MRI 01/2018  Grade 1 anterolisthesis with moderate to severe foraminal stenosis   Plan:  Follow up on his low back and Left leg pain   Patient is s/p repeat Left L5 and S1 TFESI with good response, patint mentioned that his leg pain had improved by 50%    Will start patient on Gabapentin 200 mg to be titrated to 400 mg daily  OARRS report reviewed  Patient encouraged to stay active, patient started physical therapy   Patient is not cleared to go back to work yet  Treatment plan discussed with the patient including medications and procedure side effects        ccrefcoltening calixto Caldwell M.D.

## 2019-05-03 ENCOUNTER — TREATMENT (OUTPATIENT)
Dept: PHYSICAL THERAPY | Age: 44
End: 2019-05-03
Payer: COMMERCIAL

## 2019-05-03 DIAGNOSIS — M43.16 SPONDYLOLISTHESIS OF LUMBAR REGION: Primary | ICD-10-CM

## 2019-05-03 PROCEDURE — 97530 THERAPEUTIC ACTIVITIES: CPT | Performed by: PHYSICAL THERAPIST

## 2019-05-03 PROCEDURE — 97110 THERAPEUTIC EXERCISES: CPT | Performed by: PHYSICAL THERAPIST

## 2019-05-03 NOTE — PROGRESS NOTES
Physical Therapy Treatment Note    Date: 5/3/2019  Patient Name: Alida Mistry  : 1975   MRN: [id number]  Referring Provider: Kindra Fernandez MD  [facility], [address] [place] [address], [address]                             Medical Diagnosis:    M54.41 (ICD-10-CM) - Lumbago with sciatica, right side   M54.42 (ICD-10-CM) - Lumbago with sciatica, left side   G89.29 (ICD-10-CM) - Other chronic pain      Onset date: 2017 2 episodes lifting that resolved in 2 days then 10/25/2017 episode with bob that preceded this current course of pain  Onset[de-identified] Sudden onset  Mechanism of Injury: pulling a bob up stairs with food felt a pop and pain  Chief complaint: pain across LB, bilateral buttocks and down posterior L leg into foot    Outcome Measure:    Oswestry Score #9 visit:  42% Oswestry at 9 and 18  S: Pt reports he is 50%, pain less frequent and now 4/10 at worst  Time 2972-8794     Visit  18 visits approved 3/21-    Pain 3/10     ROM      Modalities      MH            Exercise      ALL EXERCISE DONE WITH DRAW-IN TECHNIQUE                            Functional activities To aid in reaching , pushing, pulling tasks at home     ROWS: H 20# 2 x 20  TA   ROWS: M 20# 2 x 20  TA   ROWS: L 20# 2 x 20  TA   Punches Blue 2 x 20  TA   Obliques - high Grey x 20 ea dir  TA   Obliques - low Grey x 20 ea dir  TA    THEREX     Nustep        Treadmill 2 X 10 min  Before and after exercise TE   Punches      Lawnmower pulls 15# x 20 ea side  TA   Lat pulldowns      Triceps ext standing      Marching 2 x 45ft  TA   Side stepping 2 x 45ft  TA   Alt DB press 8# 2 x 20  TE   squats X 20  TE   Trunk ext TB      Trunk flex TB      Hip abd      Hip EXT      TG Squats      Leg Press 60# 2 x 20  TE         A: Tolerated treatment well. Large, functional, dynamic, global movements used to build strength, balance, endurance, and flexibility and to improve physical performance.  Feedback and cues necessary for developing neuromuscular control. Movement education and guided movement interventions such as balance, stability used to improve performance and control.   P: Continue with rehab plan  [address] PT    Treatment Charges: Mins Units   Initial Evaluation     Re-Evaluation     Ther Exercise         TE 30 2   Manual Therapy     MT     Ther Activities        TA 30 2   Gait Training          GT     Neuro Re-education NR     Modalities     Non-Billable Service Time     Other     Total Time/Units 60 4

## 2019-05-06 ENCOUNTER — TREATMENT (OUTPATIENT)
Dept: PHYSICAL THERAPY | Age: 44
End: 2019-05-06
Payer: COMMERCIAL

## 2019-05-06 DIAGNOSIS — M43.16 SPONDYLOLISTHESIS OF LUMBAR REGION: Primary | ICD-10-CM

## 2019-05-06 PROCEDURE — 97110 THERAPEUTIC EXERCISES: CPT

## 2019-05-06 PROCEDURE — 97530 THERAPEUTIC ACTIVITIES: CPT

## 2019-05-08 ENCOUNTER — TREATMENT (OUTPATIENT)
Dept: PHYSICAL THERAPY | Age: 44
End: 2019-05-08
Payer: COMMERCIAL

## 2019-05-08 DIAGNOSIS — G89.29 OTHER CHRONIC PAIN: Primary | ICD-10-CM

## 2019-05-08 PROCEDURE — 97110 THERAPEUTIC EXERCISES: CPT

## 2019-05-08 PROCEDURE — 97530 THERAPEUTIC ACTIVITIES: CPT

## 2019-05-08 NOTE — PROGRESS NOTES
Physical Therapy Treatment Note    Date: 2019  Patient Name: Shazia Gama  : 1975   MRN: [id number]  Referring Provider: Claudine Griffin MD  [address] [place] [address], [address] [place] [address], [address] [place] [address] Jackelyn S                             Medical Diagnosis:    M54.41 (ICD-10-CM) - Lumbago with sciatica, right side   M54.42 (ICD-10-CM) - Lumbago with sciatica, left side   G89.29 (ICD-10-CM) - Other chronic pain      Onset date: 2017 2 episodes lifting that resolved in 2 days then 10/25/2017 episode with bob that preceded this current course of pain  Onset[de-identified] Sudden onset  Mechanism of Injury: pulling a bob up stairs with food felt a pop and pain  Chief complaint: pain across LB, bilateral buttocks and down posterior L leg into foot    Outcome Measure:    Oswestry Score #9 visit:  42% Oswestry at 9 and 18  S: Pt stated he started working out yesterday and had difficulty performing because he is \"so out of shape\" and not because of back soreness or pain. Pt con't to have pain only with laying or sitting for long periods. Time 14:00-15:00Pt arrived at 1441    Visit  visits approved 3/21-    Pain 0/10    ROM      Modalities      MH            Exercise      ALL EXERCISE DONE WITH DRAW-IN TECHNIQUE                            Functional activities To aid in reaching , pushing, pulling tasks at home     ROWS: H 20# 2 x 20  TA   ROWS: M 20# 2 x 20  TA   ROWS: L 20# 2 x 20  TA   Punches  Black  2 x 20  TA   Obliques - high Grey x 20 ea dir  TA   Obliques - low Grey x 20 ea dir  TA    THEREX     Nustep        Treadmill 2 X 10 min  Before and after exercise TE   Punches      Lawnmower pulls 15# x 20 ea side  TA   Lat pulldowns      Triceps ext standing      Marching 2 x 45ft  TA   Side stepping 2 x 45ft  TA   Alt DB press 8# 2 x 20  TE   squats X 20  TE   Trunk ext TB      Trunk flex TB      Hip abd      Hip EXT      TG Squats      Leg Press      80#  2 x 20  TE         A: Tolerated treatment well.  Large, functional, dynamic, global movements used to build strength, balance, endurance, and flexibility and to improve physical performance. Feedback and cues necessary for developing neuromuscular control. Movement education and guided movement interventions such as balance, stability used to improve performance and control. P: Continue with rehab plan.  Visits approved until 5/21 /2019   Ely Martínez, HIRO    Treatment Charges: Mins Units   Initial Evaluation     Re-Evaluation     Ther Exercise         TE 30 2   Manual Therapy     MT     Ther Activities        TA 30 2   Gait Training          GT     Neuro Re-education NR     Modalities     Non-Billable Service Time     Other     Total Time/Units 60 4

## 2019-05-15 ENCOUNTER — TREATMENT (OUTPATIENT)
Dept: PHYSICAL THERAPY | Age: 44
End: 2019-05-15
Payer: COMMERCIAL

## 2019-05-15 DIAGNOSIS — G89.29 OTHER CHRONIC PAIN: Primary | ICD-10-CM

## 2019-05-15 PROCEDURE — 97530 THERAPEUTIC ACTIVITIES: CPT

## 2019-05-15 PROCEDURE — 97110 THERAPEUTIC EXERCISES: CPT

## 2019-05-15 NOTE — PROGRESS NOTES
Physical Therapy Treatment Note    Date: 5/15/2019  Patient Name: Trevor Becerra  : 1975   MRN: [id number]  Referring Provider: Marilyn Ness MD  [address] [place] [address], [address] [place] [address], [address] [place] [address] Jackelyn S                             Medical Diagnosis:    M54.41 (ICD-10-CM) - Lumbago with sciatica, right side   M54.42 (ICD-10-CM) - Lumbago with sciatica, left side   G89.29 (ICD-10-CM) - Other chronic pain      Onset date: 2017 2 episodes lifting that resolved in 2 days then 10/25/2017 episode with bob that preceded this current course of pain  Onset[de-identified] Sudden onset  Mechanism of Injury: pulling a bob up stairs with food felt a pop and pain  Chief complaint: pain across LB, bilateral buttocks and down posterior L leg into foot    Outcome Measure:    Oswestry Score #9 visit:  42% Oswestry at 9 and 18  S: Pt stated he must of sleep wrong last night due to having a sore back today. Time 1312-1407Pt arrived at 1312    Visit 14 /1818 visits approved 3/21-    Pain 0/10    ROM      Modalities      MH            Exercise      ALL EXERCISE DONE WITH DRAW-IN TECHNIQUE                            Functional activities To aid in reaching , pushing, pulling tasks at home     ROWS: H 25# 2 x 20  TA   ROWS: M 25# 2 x 20  TA   ROWS: L 25# 2 x 20  TA   Punches  Black  2 x 20  TA   Obliques - high Grey x 20 ea dir  TA   Obliques - low Grey x 20 ea dir  TA    THEREX     Nustep        Treadmill 2 X 10 min  Before and after exercise TE   Punches      Lawnmower pulls 15# x 20 ea side  TA   Lat pulldowns      Triceps ext standing      Marching 2 x 45ft  TA   Side stepping 2 x 45ft  TA   Alt DB press 8# 2 x 20  TE   squats X 20  TE   Trunk ext TB      Trunk flex TB      Hip abd      Hip EXT      TG Squats      Leg Press      80#  2 x 20  TE         A: Tolerated treatment well. Large, functional, dynamic, global movements used to build strength, balance, endurance, and flexibility and to improve physical performance. Feedback and cues necessary for developing neuromuscular control. Movement education and guided movement interventions such as balance, stability used to improve performance and control. P: Continue with rehab plan.  Visits approved until 5/21 /2019   Ted Lubin PTA    Treatment Charges: Mins Units   Initial Evaluation     Re-Evaluation     Ther Exercise         TE 20 1   Manual Therapy     MT     Ther Activities        TA 30 2   Gait Training          GT     Neuro Re-education NR     Modalities     Non-Billable Service Time     Other     Total Time/Units 50 3

## 2019-05-17 ENCOUNTER — TREATMENT (OUTPATIENT)
Dept: PHYSICAL THERAPY | Age: 44
End: 2019-05-17
Payer: COMMERCIAL

## 2019-05-17 DIAGNOSIS — G89.29 OTHER CHRONIC PAIN: Primary | ICD-10-CM

## 2019-05-17 PROCEDURE — 97530 THERAPEUTIC ACTIVITIES: CPT

## 2019-05-17 PROCEDURE — 97110 THERAPEUTIC EXERCISES: CPT

## 2019-05-17 NOTE — PROGRESS NOTES
performance. Feedback and cues necessary for developing neuromuscular control. Movement education and guided movement interventions such as balance, stability used to improve performance and control ). P: Continue with rehab plan.  Visits approved until Friday 5/24 /2019   Ingrid Vidal PTA    Treatment Charges: Mins Units   Initial Evaluation     Re-Evaluation     Ther Exercise         TE 20 1   Manual Therapy     MT     Ther Activities        TA 30 2   Gait Training          GT     Neuro Re-education NR     Modalities     Non-Billable Service Time     Other     Total Time/Units 50 3

## 2019-05-20 ENCOUNTER — TREATMENT (OUTPATIENT)
Dept: PHYSICAL THERAPY | Age: 44
End: 2019-05-20
Payer: COMMERCIAL

## 2019-05-20 DIAGNOSIS — G89.29 OTHER CHRONIC PAIN: Primary | ICD-10-CM

## 2019-05-20 PROCEDURE — 97110 THERAPEUTIC EXERCISES: CPT

## 2019-05-20 PROCEDURE — 97530 THERAPEUTIC ACTIVITIES: CPT

## 2019-05-20 NOTE — PROGRESS NOTES
Physical Therapy Treatment Note    Date: 2019  Patient Name: Js Strange  : 1975   MRN: [id number]  Referring Provider: Baylee Avery MD  [address] [place] [address], [address] [place] [address], [address] [place] [address] Jackelyn S                             Medical Diagnosis:    M54.41 (ICD-10-CM) - Lumbago with sciatica, right side   M54.42 (ICD-10-CM) - Lumbago with sciatica, left side   G89.29 (ICD-10-CM) - Other chronic pain      Onset date: 2017 2 episodes lifting that resolved in 2 days then 10/25/2017 episode with bob that preceded this current course of pain  Onset[de-identified] Sudden onset  Mechanism of Injury: pulling a bbo up stairs with food felt a pop and pain  Chief complaint: pain across LB, bilateral buttocks and down posterior L leg into foot    Outcome Measure:    Oswestry Score #9 visit:  42% Oswestry at 9 and 18  S: Pt states injections done on L side of back helped but still has soreness down R leg and B low back but not as extreme. Time 1114-7298 Pt arrived at [address]    Visit 16  visits approved 3/21-    Pain 0/10 new 19    ROM      Modalities      MH            Exercise      ALL EXERCISE DONE WITH DRAW-IN TECHNIQUE                            Functional activities To aid in reaching , pushing, pulling tasks at home     ROWS: H 25# 2 x 20  TA   ROWS: M 25# 2 x 20  TA   ROWS: L 25# 2 x 20  TA   Punches  Black  2 x 20  TA   Obliques - high Grey x 20 ea dir  TA   Obliques - low Grey x 20 ea dir  TA    THEREX     Nustep        Treadmill 2 X 10 min  Before and after exercise TE   Punches      Lawnmower pulls 15# x 20 ea side  TA   Lat pulldowns      Triceps ext standing      Marching 2 x 45ft  TA   Side stepping 2 x 45ft  TA   Alt DB press 8# 2 x 20  TE   squats X 20  TE   Trunk ext TB      Trunk flex TB      Hip abd      Hip EXT      TG Squats      Leg Press     100# 2 x 20  progression in wt  TE         A: Tolerated treatment well. ( Large, functional, dynamic, global movements used to build strength, balance, endurance, and flexibility and to improve physical performance. Feedback and cues necessary for developing neuromuscular control. Movement education and guided movement interventions such as balance, stability used to improve performance and control ). P: Continue with rehab plan.  Visits approved until Friday 5/24 /2019   Rebeccajona WeinbergHIRO    Treatment Charges: Mins Units   Initial Evaluation     Re-Evaluation     Ther Exercise         TE 20 1   Manual Therapy     MT     Ther Activities        TA 30 2   Gait Training          GT     Neuro Re-education NR     Modalities     Non-Billable Service Time     Other     Total Time/Units 50 3

## 2019-05-22 ENCOUNTER — TREATMENT (OUTPATIENT)
Dept: PHYSICAL THERAPY | Age: 44
End: 2019-05-22
Payer: COMMERCIAL

## 2019-05-22 DIAGNOSIS — G89.29 OTHER CHRONIC PAIN: Primary | ICD-10-CM

## 2019-05-22 PROCEDURE — 97530 THERAPEUTIC ACTIVITIES: CPT

## 2019-05-22 PROCEDURE — 97110 THERAPEUTIC EXERCISES: CPT

## 2019-05-22 NOTE — PROGRESS NOTES
Physical Therapy Treatment Note    Date: 2019  Patient Name: Diane De Leon  : 1975   MRN: [id number]  Referring Provider: Miguel Tijerina MD  [address] [place] [address], [address] [place] [address], [address] [place] [address] Cecilio\[facility] [place] [facility]\""                             Medical Diagnosis:    M54.41 (ICD-10-CM) - Lumbago with sciatica, right side   M54.42 (ICD-10-CM) - Lumbago with sciatica, left side   G89.29 (ICD-10-CM) - Other chronic pain      Onset date: 2017 2 episodes lifting that resolved in 2 days then 10/25/2017 episode with bob that preceded this current course of pain  Onset[de-identified] Sudden onset  Mechanism of Injury: pulling a bob up stairs with food felt a pop and pain  Chief complaint: pain across LB, bilateral buttocks and down posterior L leg into foot    Outcome Measure:    Oswestry Score #9 visit:  42% Oswestry at 9 and 18  S: Pt reports having back soreness today. Time 1305-1400Pt arrived at [address]    Visit 8 visits approved 3/21-    Pain 0/10 new 19    ROM      Modalities      MH            Exercise      ALL EXERCISE DONE WITH DRAW-IN TECHNIQUE                            Functional activities To aid in reaching , pushing, pulling tasks at home     ROWS: H 25# 2 x 20  TA   ROWS: M 25# 2 x 20  TA   ROWS: L 25# 2 x 20  TA   Punches  Black  2 x 20  TA   Obliques - high Grey x 20 ea dir  TA   Obliques - low Grey x 20 ea dir  TA    THEREX     Nustep        Treadmill 2 X 10 min  Before and after exercise TE   Punches      Lawnmower pulls 15# x 20 ea side  TA   Lat pulldowns      Triceps ext standing      Marching 2 x 45ft  TA   Side stepping 2 x 45ft  TA   Alt DB press 8# 2 x 20  TE   squats X 20  TE   Trunk ext TB      Trunk flex TB      Hip abd      Hip EXT      TG Squats      Leg Press     100# 2 x 20  progression in wt  TE         A: Tolerated treatment well. ( Large, functional, dynamic, global movements used to build strength, balance, endurance, and flexibility and to improve physical performance. Feedback and cues necessary for developing neuromuscular control. Movement education and guided movement interventions such as balance, stability used to improve performance and control ). P: Continue with rehab plan.  Visits approved until Friday 5/24 /2019   Les Parada PTA    Treatment Charges: Mins Units   Initial Evaluation     Re-Evaluation     Ther Exercise         TE 20 1   Manual Therapy     MT     Ther Activities        TA 30 2   Gait Training          GT     Neuro Re-education NR     Modalities     Non-Billable Service Time     Other     Total Time/Units 50 3

## 2019-05-24 ENCOUNTER — TREATMENT (OUTPATIENT)
Dept: PHYSICAL THERAPY | Age: 44
End: 2019-05-24
Payer: COMMERCIAL

## 2019-05-24 DIAGNOSIS — G89.29 OTHER CHRONIC PAIN: Primary | ICD-10-CM

## 2019-05-24 PROCEDURE — 97530 THERAPEUTIC ACTIVITIES: CPT

## 2019-05-24 PROCEDURE — 97110 THERAPEUTIC EXERCISES: CPT

## 2019-05-24 NOTE — PROGRESS NOTES
Physical Therapy Treatment Note    Date: 2019  Patient Name: Shazia Gama  : 1975   MRN: [id number]  Referring Provider: Claudine Griffin MD  [address] [place] [address], [address] [place] [address], [address] [place] [address] Jackelyn S                             Medical Diagnosis:    M54.41 (ICD-10-CM) - Lumbago with sciatica, right side   M54.42 (ICD-10-CM) - Lumbago with sciatica, left side   G89.29 (ICD-10-CM) - Other chronic pain      Onset date: 2017 2 episodes lifting that resolved in 2 days then 10/25/2017 episode with bob that preceded this current course of pain  Onset[de-identified] Sudden onset  Mechanism of Injury: pulling a bob up stairs with food felt a pop and pain  Chief complaint: pain across LB, bilateral buttocks and down posterior L leg into foot    Outcome Measure:    Oswestry Score #9 visit:  42% Oswestry at 9 and 18  S: Pt reports having back soreness today. Time 1305-1400Pt arrived at 22378    Visit  visits approved 3/21-    Pain 0/10 new 19    ROM      Modalities      MH            Exercise      ALL EXERCISE DONE WITH DRAW-IN TECHNIQUE                            Functional activities To aid in reaching , pushing, pulling tasks at home     ROWS: H 25# 2 x 20  TA   ROWS: M 25# 2 x 20  TA   ROWS: L 25# 2 x 20  TA   Punches  Black  2 x 20  TA   Obliques - high Grey x 20 ea dir  TA   Obliques - low Grey x 20 ea dir  TA    THEREX     Nustep        Treadmill 2 X 10 min  Before and after exercise TE   Punches      Lawnmower pulls 15# x 20 ea side  TA   Lat pulldowns      Triceps ext standing      Marching 2 x 45ft  TA   Side stepping 2 x 45ft  TA   Alt DB press 8# 2 x 20  TE   squats X 20  TE   Trunk ext TB      Trunk flex TB      Hip abd      Hip EXT      TG Squats      Leg Press     100# 2 x 20  progression in wt  TE         A: Tolerated treatment well. ( Large, functional, dynamic, global movements used to build strength, balance, endurance, and flexibility and to improve physical performance. Feedback and cues necessary for developing neuromuscular control. Movement education and guided movement interventions such as balance, stability used to improve performance and control ). P: Continue with rehab plan. Visits approved until Friday 5/24 /2019 . Last rx session of approved visits.   Maximiliano Khoury PTA    Treatment Charges: Mins Units   Initial Evaluation     Re-Evaluation     Ther Exercise         TE 20 1   Manual Therapy     MT     Ther Activities        TA 30 2   Gait Training          GT     Neuro Re-education NR     Modalities     Non-Billable Service Time     Other     Total Time/Units 50 3

## 2019-05-24 NOTE — PROGRESS NOTES
[facility] OUTPATIENT REHABILITATION  PHYSICAL THERAPY PROGRESS REPORT      Patient: Jaswinder Strickland                : 1975                      MRN: [id number]  Referring Provider: Autumn David MD  [address] [place] [address], [address] [place] [address], [facility] [place] S                             Medical Diagnosis:   M54.41 (ICD-10-CM) - Lumbago with sciatica, right side   M54.42 (ICD-10-CM) - Lumbago with sciatica, left side   G89.29 (ICD-10-CM) - Other chronic pain      Onset date: 2017 2 episodes lifting that resolved in 2 days then 10/25/2017 episode with bob that preceded this current course of pain     Onset[de-identified] Sudden onset     Mechanism of Injury: pulling a bob up stairs with food felt a pop and pain     Chief complaint: pain across LB, bilateral buttocks and down posterior L leg into foot    ATTENDANCE:  Patient has attended 18 of 18 scheduled treatments from 19  to 19. TREATMENTS RECEIVED:  Heat, electrical stimulation, dynamic trunk stabilization exercises emphasized    INITIAL STATUS:  · Pain 1-7/10  · SLR, nerve root compression test and slump test all positive  · Trunk strength 4+/5    CURRENT STATUS:  · Pain 1-8  · SLR positive, slump test and nerve root compression negative  · Trunk strength 5/5    OUTCOME MEASURE:  Oswestry 46% reported today 19, 44% disability was reported st initial evaluation    COMMENTS AND RECOMMENDATIONS:   Reports he is 60% improved since initial evaluation. States he is able to do more activities with less pain. States his L LE pain is 80-85% relieved, states he occasionally gets tingling in the left calf but his LBP has increased somewhat the past few days. States his LBP is 4/10 today, was 8/10 yesterday. His Oswestry Low Back Pain Disability Questionnaire actually was worse from initial evaluation, 44%, to today 46%. Thank you for the opportunity to work with your patient.  If you have questions or comments, please contact me [phone]; fax [phone].     Patrica Streeter, PT

## 2019-06-19 ENCOUNTER — OFFICE VISIT (OUTPATIENT)
Dept: PAIN MANAGEMENT | Age: 44
End: 2019-06-19
Payer: COMMERCIAL

## 2019-06-19 VITALS
OXYGEN SATURATION: 98 % | WEIGHT: 217 LBS | HEART RATE: 90 BPM | RESPIRATION RATE: 16 BRPM | SYSTOLIC BLOOD PRESSURE: 120 MMHG | HEIGHT: 71 IN | TEMPERATURE: 97.4 F | BODY MASS INDEX: 30.38 KG/M2 | DIASTOLIC BLOOD PRESSURE: 82 MMHG

## 2019-06-19 DIAGNOSIS — M43.16 SPONDYLOLISTHESIS OF LUMBAR REGION: Primary | ICD-10-CM

## 2019-06-19 DIAGNOSIS — G89.4 CHRONIC PAIN SYNDROME: ICD-10-CM

## 2019-06-19 PROCEDURE — 99213 OFFICE O/P EST LOW 20 MIN: CPT | Performed by: PAIN MEDICINE

## 2019-06-19 RX ORDER — GABAPENTIN 100 MG/1
200 CAPSULE ORAL 2 TIMES DAILY
Qty: 120 CAPSULE | Refills: 0 | Status: SHIPPED | OUTPATIENT
Start: 2019-06-19 | End: 2019-07-17 | Stop reason: SDUPTHER

## 2019-06-19 NOTE — PROGRESS NOTES
Number of children: Not on file    Years of education: Not on file    Highest education level: Not on file   Occupational History    Not on file   Social Needs    Financial resource strain: Not on file    Food insecurity:     Worry: Not on file     Inability: Not on file    Transportation needs:     Medical: Not on file     Non-medical: Not on file   Tobacco Use    Smoking status: Never Smoker    Smokeless tobacco: Never Used   Substance and Sexual Activity    Alcohol use: No    Drug use: No    Sexual activity: Not on file   Lifestyle    Physical activity:     Days per week: Not on file     Minutes per session: Not on file    Stress: Not on file   Relationships    Social connections:     Talks on phone: Not on file     Gets together: Not on file     Attends [employer] service: Not on file     Active member of club or organization: Not on file     Attends meetings of clubs or organizations: Not on file     Relationship status: Not on file    Intimate partner violence:     Fear of current or ex partner: Not on file     Emotionally abused: Not on file     Physically abused: Not on file     Forced sexual activity: Not on file   Other Topics Concern    Not on file   Social History Narrative    Not on file     Family History   Problem Relation Age of Onset    Cancer Father     Coronary Art Dis Father     Hypertension Father      REVIEW OF SYSTEMS:     Lawernce Pae denies fever/chills, chest pain, shortness of breath, new bowel or bladder complaints. All other review of systems was negative. PHYSICAL EXAMINATION:      /82 (Site: Left Upper Arm, Position: Sitting, Cuff Size: Medium Adult)   Pulse 90   Temp 97.4 °F (36.3 °C) (Oral)   Resp 16   Ht 5' 11\" (1.803 m)   Wt 217 lb (98.4 kg)   SpO2 98%   BMI 30.27 kg/m²     General:       General appearance:   pleasant and well-hydrated.    , in mild to moderate discomfort and A & O x3  Build:Normal Weight  Function:Rises from a seated position easily.     HEENT:     Head:normocephalic and atraumatic  Pupils:regular, round and equal.  Sclera: icterus absent,      Lungs:     Breathing:Breathing Pattern: regular, no distress     Abdomen:     Shape:non-distended and normal  Tenderness:none  Guarding:none     Lumbar spine:     Spine inspection:normal   CVA tenderness:No   Palpation:tenderness paravertebral muscles, facet loading, left, right and negative. Range of motion:abnormal mildly Lateral bending, flexion, extension rotation bilateral and is  painful.     Musculoskeletal:     Trigger points in Paraveteral:absent bilaterally  SI joint tenderness:negative right, negative left              KRISTAL test:not done right, not done             left  Piriformis tenderness:negative right, negative left  Trochanteric bursa tenderness:negative right, negative left  SLR:negative right, negative left, sitting      Extremities:     Tremors:None bilaterally upper and lower  Range of motion pain with internal rotation of hips negative.   Intact:Yes  Edema:Normal     Neurological:     Sensory:diminished to light touch Left L5 dermatome   Motor:               Right Quadriceps5/5          Left Quadriceps5/5           Right Gastrocnemius5/5    Left Gastrocnemius5/5  Right Ant Tibialis5/5  Left Ant Tibialis5/5  Reflexes:    Right Quadriceps reflex2+  Left Quadriceps reflex2+  Right Achilles reflex2+  Left Achilles reflex2+  Gait:antalgic     Dermatology:     Skin:no unusual rashes and no skin lesions     Impression:    Chronic low back pain with radiation to the Left lower extremity along the Left L5 dermatome   Patient had seen Nany Ward and is a candidate for surgery if conservative treatment fails (L5-S1 posterior interbody fusion)  Lumbar spine MRI 01/2018  Grade 1 anterolisthesis with moderate to severe foraminal stenosis   Plan:  Follow up on his low back and Left leg pain, no acute issues  Patient had seen an independent medical examiner, he deemed [address] [place] [address] to benefit from Left L5 and S1 TFESI, will repeat as needed   Continue with Gabapentin 400 mg daily per patient it is helping  OARRS report reviewed  Patient encouraged to stay active, patient finished physical therapy and per patient it is helping   Treatment plan discussed with the patient including medications and procedure side effects      ccreferring calixto Spencer M.D.

## 2019-06-19 NOTE — PROGRESS NOTES
Vince Polanco presents to the [address] on 6/19/2019. Anisha Barreto is complaining of pain lower back . The pain is persistent. The pain is described as aching, throbbing, shooting, stabbing, dull, sharp, tender, burning and miserable. Pain is rated on his best day at a 2, on his worst day at a 8, and on average at a 4 on the VAS scale. He took his last dose of Neurontin this am.        Any procedures since your last visit: No, with  % relief. He has been on anticoagulation medications to include none and is managed by   . Pacemaker or defibrilator: No Physician managing device is . /82 (Site: Left Upper Arm, Position: Sitting, Cuff Size: Medium Adult)   Pulse 90   Temp 97.4 °F (36.3 °C) (Oral)   Resp 16   Ht 5' 11\" (1.803 m)   Wt 217 lb (98.4 kg)   SpO2 98%   BMI 30.27 kg/m²      No LMP for male patient.

## 2019-07-17 RX ORDER — GABAPENTIN 100 MG/1
200 CAPSULE ORAL 2 TIMES DAILY
Qty: 120 CAPSULE | Refills: 0 | Status: SHIPPED | OUTPATIENT
Start: 2019-07-17 | End: 2019-08-19 | Stop reason: SDUPTHER

## 2019-08-19 ENCOUNTER — OFFICE VISIT (OUTPATIENT)
Dept: PAIN MANAGEMENT | Age: 44
End: 2019-08-19
Payer: COMMERCIAL

## 2019-08-19 VITALS
DIASTOLIC BLOOD PRESSURE: 82 MMHG | BODY MASS INDEX: 30.8 KG/M2 | HEART RATE: 86 BPM | WEIGHT: 220 LBS | SYSTOLIC BLOOD PRESSURE: 120 MMHG | TEMPERATURE: 99.1 F | RESPIRATION RATE: 16 BRPM | HEIGHT: 71 IN | OXYGEN SATURATION: 95 %

## 2019-08-19 DIAGNOSIS — M43.16 SPONDYLOLISTHESIS OF LUMBAR REGION: ICD-10-CM

## 2019-08-19 DIAGNOSIS — G89.4 CHRONIC PAIN SYNDROME: Primary | ICD-10-CM

## 2019-08-19 PROCEDURE — 99213 OFFICE O/P EST LOW 20 MIN: CPT

## 2019-08-19 PROCEDURE — 99213 OFFICE O/P EST LOW 20 MIN: CPT | Performed by: NURSE PRACTITIONER

## 2019-08-19 RX ORDER — GABAPENTIN 100 MG/1
200 CAPSULE ORAL 2 TIMES DAILY
Qty: 120 CAPSULE | Refills: 1 | Status: SHIPPED | OUTPATIENT
Start: 2019-08-19 | End: 2019-10-14 | Stop reason: SDUPTHER

## 2019-08-19 NOTE — PROGRESS NOTES
[address]  [facility], [address] [place] [address] Linus  [phone]    Follow up Note      Martha Garcia     Date of Visit:  8/19/2019    CC:  Patient presents for follow up   Chief Complaint   Patient presents with    Back Pain     lower back left leg       HPI:    Pain is unchanged. Change in quality of symptoms:no. Medication side effects:none. Recent diagnostic testing:none. Recent interventional procedures:none. He has not been on anticoagulation medications to include none. The patient  has not been on herbal supplements. The patient is not diabetic. Imaging studies:  Lumbar spine MRI 01//2018    EMG 05/2018  Left L5 radiculopathy       Potential Aberrant Drug-Related Behavior: None    Urine Drug Screening:  None no opioids started     OARRS report:  03/2019 consistent   05/2019 consistent   06/2019 consistent   8/2019 Consistent     Controlled Substances Monitoring:     RX Monitoring 8/19/2019   Periodic Controlled Substance Monitoring Possible medication side effects, risk of tolerance/dependence & alternative treatments discussed. ;No signs of potential drug abuse or diversion identified. History reviewed. No pertinent past medical history. Past Surgical History:   Procedure Laterality Date    ANESTHESIA NERVE BLOCK Left 3/21/2019    LEFT L5 AND S1 TRANSFORAMINAL EPIDURAL STEROID INJECTION performed by Diego Paris MD at [address] [place] [address] Left 4/18/2019    LEFT L5 AND S1 TRANSFORAMINAL EPIDURAL STEROID INJECTION performed by Diego Paris MD at [place] [address]      thumb    KNEE ARTHROSCOPY      NERVE BLOCK Left 03/21/2019    lumbar transforaminal epidural    NERVE BLOCK Left 04/18/2019       Prior to Admission medications    Medication Sig Start Date End Date Taking?  Authorizing Provider   NONFORMULARY every 8 hours as needed Indications: lidocaine patches   Yes Historical Provider, MD

## 2019-10-14 ENCOUNTER — OFFICE VISIT (OUTPATIENT)
Dept: PAIN MANAGEMENT | Age: 44
End: 2019-10-14
Payer: COMMERCIAL

## 2019-10-14 ENCOUNTER — PREP FOR PROCEDURE (OUTPATIENT)
Dept: PAIN MANAGEMENT | Age: 44
End: 2019-10-14

## 2019-10-14 VITALS
BODY MASS INDEX: 31.5 KG/M2 | TEMPERATURE: 98 F | DIASTOLIC BLOOD PRESSURE: 74 MMHG | SYSTOLIC BLOOD PRESSURE: 100 MMHG | HEIGHT: 71 IN | OXYGEN SATURATION: 95 % | RESPIRATION RATE: 18 BRPM | WEIGHT: 225 LBS | HEART RATE: 86 BPM

## 2019-10-14 DIAGNOSIS — M43.16 SPONDYLOLISTHESIS OF LUMBAR REGION: ICD-10-CM

## 2019-10-14 DIAGNOSIS — G89.4 CHRONIC PAIN SYNDROME: Primary | ICD-10-CM

## 2019-10-14 PROCEDURE — 99213 OFFICE O/P EST LOW 20 MIN: CPT | Performed by: NURSE PRACTITIONER

## 2019-10-14 RX ORDER — GABAPENTIN 100 MG/1
CAPSULE ORAL
Refills: 0 | COMMUNITY
Start: 2019-10-02 | End: 2019-12-12 | Stop reason: SDUPTHER

## 2019-10-14 RX ORDER — GABAPENTIN 100 MG/1
200 CAPSULE ORAL 2 TIMES DAILY
Qty: 120 CAPSULE | Refills: 1 | Status: SHIPPED | OUTPATIENT
Start: 2019-10-14 | End: 2019-11-12

## 2019-11-14 ENCOUNTER — HOSPITAL ENCOUNTER (OUTPATIENT)
Dept: OPERATING ROOM | Age: 44
Setting detail: OUTPATIENT SURGERY
Discharge: HOME | End: 2019-11-14
Attending: PAIN MEDICINE
Payer: COMMERCIAL

## 2019-11-14 ENCOUNTER — HOSPITAL ENCOUNTER (OUTPATIENT)
Age: 44
Setting detail: OUTPATIENT SURGERY
Discharge: HOME | End: 2019-11-14
Attending: PAIN MEDICINE | Admitting: PAIN MEDICINE
Payer: COMMERCIAL

## 2019-11-14 VITALS
DIASTOLIC BLOOD PRESSURE: 89 MMHG | HEART RATE: 78 BPM | SYSTOLIC BLOOD PRESSURE: 150 MMHG | OXYGEN SATURATION: 96 % | RESPIRATION RATE: 16 BRPM

## 2019-11-14 DIAGNOSIS — M51.9 LUMBAR DISC DISEASE: ICD-10-CM

## 2019-11-14 PROBLEM — M54.16 LUMBAR RADICULOPATHY: Status: ACTIVE | Noted: 2019-11-14

## 2019-11-14 PROCEDURE — 6360000004 HC RX CONTRAST MEDICATION: Performed by: PAIN MEDICINE

## 2019-11-14 PROCEDURE — 2709999900 HC NON-CHARGEABLE SUPPLY: Performed by: PAIN MEDICINE

## 2019-11-14 PROCEDURE — 3600000005 HC SURGERY LEVEL 5 BASE: Performed by: PAIN MEDICINE

## 2019-11-14 PROCEDURE — 6360000002 HC RX W HCPCS: Performed by: PAIN MEDICINE

## 2019-11-14 PROCEDURE — 64483 NJX AA&/STRD TFRM EPI L/S 1: CPT | Performed by: PAIN MEDICINE

## 2019-11-14 PROCEDURE — 3209999900 FLUORO FOR SURGICAL PROCEDURES

## 2019-11-14 PROCEDURE — 7100000011 HC PHASE II RECOVERY - ADDTL 15 MIN: Performed by: PAIN MEDICINE

## 2019-11-14 PROCEDURE — 7100000010 HC PHASE II RECOVERY - FIRST 15 MIN: Performed by: PAIN MEDICINE

## 2019-11-14 PROCEDURE — 64484 NJX AA&/STRD TFRM EPI L/S EA: CPT | Performed by: PAIN MEDICINE

## 2019-11-14 PROCEDURE — 2500000003 HC RX 250 WO HCPCS: Performed by: PAIN MEDICINE

## 2019-11-14 RX ORDER — LIDOCAINE HYDROCHLORIDE 5 MG/ML
INJECTION, SOLUTION INFILTRATION; INTRAVENOUS PRN
Status: DISCONTINUED | OUTPATIENT
Start: 2019-11-14 | End: 2019-11-14 | Stop reason: ALTCHOICE

## 2019-11-14 ASSESSMENT — PAIN - FUNCTIONAL ASSESSMENT
PAIN_FUNCTIONAL_ASSESSMENT: 0-10
PAIN_FUNCTIONAL_ASSESSMENT: PREVENTS OR INTERFERES SOME ACTIVE ACTIVITIES AND ADLS

## 2019-11-14 ASSESSMENT — PAIN SCALES - GENERAL
PAINLEVEL_OUTOF10: 0
PAINLEVEL_OUTOF10: 0

## 2019-11-14 ASSESSMENT — PAIN DESCRIPTION - DESCRIPTORS: DESCRIPTORS: ACHING

## 2019-12-12 ENCOUNTER — OFFICE VISIT (OUTPATIENT)
Dept: PAIN MANAGEMENT | Age: 44
End: 2019-12-12
Payer: COMMERCIAL

## 2019-12-12 VITALS
HEART RATE: 88 BPM | OXYGEN SATURATION: 96 % | DIASTOLIC BLOOD PRESSURE: 66 MMHG | SYSTOLIC BLOOD PRESSURE: 104 MMHG | HEIGHT: 71 IN | WEIGHT: 230 LBS | TEMPERATURE: 98.1 F | RESPIRATION RATE: 16 BRPM | BODY MASS INDEX: 32.2 KG/M2

## 2019-12-12 DIAGNOSIS — M43.16 SPONDYLOLISTHESIS OF LUMBAR REGION: Primary | ICD-10-CM

## 2019-12-12 DIAGNOSIS — G89.4 CHRONIC PAIN SYNDROME: ICD-10-CM

## 2019-12-12 DIAGNOSIS — M54.16 LUMBAR RADICULOPATHY: ICD-10-CM

## 2019-12-12 PROCEDURE — 99213 OFFICE O/P EST LOW 20 MIN: CPT | Performed by: NURSE PRACTITIONER

## 2019-12-12 RX ORDER — GABAPENTIN 100 MG/1
200 CAPSULE ORAL 2 TIMES DAILY
Qty: 120 CAPSULE | Refills: 1 | Status: SHIPPED | OUTPATIENT
Start: 2019-12-12 | End: 2022-05-03

## 2020-07-28 ENCOUNTER — OFFICE VISIT (OUTPATIENT)
Dept: PAIN MANAGEMENT | Age: 45
End: 2020-07-28
Payer: COMMERCIAL

## 2020-07-28 ENCOUNTER — PREP FOR PROCEDURE (OUTPATIENT)
Dept: PAIN MANAGEMENT | Age: 45
End: 2020-07-28

## 2020-07-28 VITALS
HEART RATE: 72 BPM | TEMPERATURE: 97.6 F | HEIGHT: 71 IN | DIASTOLIC BLOOD PRESSURE: 72 MMHG | OXYGEN SATURATION: 97 % | WEIGHT: 235 LBS | BODY MASS INDEX: 32.9 KG/M2 | SYSTOLIC BLOOD PRESSURE: 118 MMHG | RESPIRATION RATE: 18 BRPM

## 2020-07-28 PROCEDURE — 99214 OFFICE O/P EST MOD 30 MIN: CPT | Performed by: PAIN MEDICINE

## 2020-07-28 PROCEDURE — 99213 OFFICE O/P EST LOW 20 MIN: CPT | Performed by: PAIN MEDICINE

## 2020-07-28 RX ORDER — HYDROCODONE BITARTRATE AND ACETAMINOPHEN 7.5; 325 MG/1; MG/1
TABLET ORAL
COMMUNITY
Start: 2020-05-11

## 2020-07-28 NOTE — PROGRESS NOTES
Medical: Not on file     Non-medical: Not on file   Tobacco Use    Smoking status: Never Smoker    Smokeless tobacco: Never Used   Substance and Sexual Activity    Alcohol use: No    Drug use: No    Sexual activity: Not on file   Lifestyle    Physical activity     Days per week: Not on file     Minutes per session: Not on file    Stress: Not on file   Relationships    Social connections     Talks on phone: Not on file     Gets together: Not on file     Attends Anabaptism service: Not on file     Active member of club or organization: Not on file     Attends meetings of clubs or organizations: Not on file     Relationship status: Not on file    Intimate partner violence     Fear of current or ex partner: Not on file     Emotionally abused: Not on file     Physically abused: Not on file     Forced sexual activity: Not on file   Other Topics Concern    Not on file   Social History Narrative    Not on file     Family History   Problem Relation Age of Onset    Cancer Father     Coronary Art Dis Father     Hypertension Father      REVIEW OF SYSTEMS:     Dara Cohen denies fever/chills, chest pain, shortness of breath, new bowel or bladder complaints. All other review of systems was negative. PHYSICAL EXAMINATION:      /72   Pulse 72   Temp 97.6 °F (36.4 °C)   Resp 18   Ht 5' 11\" (1.803 m)   Wt 235 lb (106.6 kg)   SpO2 97%   BMI 32.78 kg/m²     General:       General appearance:   pleasant and well-hydrated.    , in moderate discomfort and A & O x3  Build:Normal Weight  Function:Rises from a seated position easily.     HEENT:     Head:normocephalic and atraumatic  Sclera: icterus absent,      Lungs:     Breathing:Breathing Pattern: regular, no distress     Abdomen:     Shape:non-distended and normal  Tenderness:none  Guarding:none     Lumbar spine:     Spine inspection:normal   CVA tenderness:No   Palpation:tenderness paravertebral muscles, facet loading, left positive   Range of motion:abnormal effects.     gisell Hartman M.D.

## 2020-07-28 NOTE — PROGRESS NOTES
Do you currently have any of the following:    Fever: No  Headache:  No  Cough: No  Shortness of breath: No  Exposed to anyone with these symptoms: No                                                                                                                Hitesh Dry presents to the [address] [place] [address] on 7/28/2020. Mami Muniz is complaining of pain in the back . The pain is constant. The pain is described as aching, shooting, stabbing and burning. Pain is rated on his best day at a 2, on his worst day at a 7, and on average at a 2 on the VAS scale. He took his last dose of [place] does have issues with constipation. Any procedures since your last visit: No,   He is not on NSAIDS and  is not on anticoagulation medications to include none . Pacemaker or defibrilator: No Physician managing device is none. /72   Pulse 72   Temp 97.6 °F (36.4 °C)   Resp 18   Ht 5' 11\" (1.803 m)   Wt 235 lb (106.6 kg)   SpO2 97%   BMI 32.78 kg/m²      No LMP for male patient.

## 2020-08-18 ENCOUNTER — TELEPHONE (OUTPATIENT)
Dept: PAIN MANAGEMENT | Age: 45
End: 2020-08-18

## 2020-08-18 NOTE — TELEPHONE ENCOUNTER
8-18-20-call to Maynor Linder to advise him there is not authorization back yet from [address] for his procedure with Dr Mariah Gage on 8-24-20 . left voice mail message.     Anders Acosta RN  Pain Management

## 2020-09-28 ENCOUNTER — OFFICE VISIT (OUTPATIENT)
Dept: PAIN MANAGEMENT | Age: 45
End: 2020-09-28
Payer: COMMERCIAL

## 2020-09-28 VITALS
OXYGEN SATURATION: 96 % | BODY MASS INDEX: 32.2 KG/M2 | TEMPERATURE: 97.8 F | HEIGHT: 71 IN | SYSTOLIC BLOOD PRESSURE: 124 MMHG | DIASTOLIC BLOOD PRESSURE: 72 MMHG | RESPIRATION RATE: 18 BRPM | HEART RATE: 84 BPM | WEIGHT: 230 LBS

## 2020-09-28 PROCEDURE — 99213 OFFICE O/P EST LOW 20 MIN: CPT | Performed by: PAIN MEDICINE

## 2020-09-28 NOTE — PROGRESS NOTES
[facility], [address] [place] [address] [place]  [phone]    Follow up Note      Lieutenant Boyd     Date of Visit:  9/28/2020    CC:  Patient presents for follow up   Chief Complaint   Patient presents with    Pain     back and down the legs pain      HPI:    Follow up on his low back pain with radiation to the Left lower extremity with complaints of increased pain. Change in quality of symptoms:no. Medication side effects:none. Recent diagnostic testing:none. Recent interventional procedures:none    He has not been on anticoagulation medications to include none and has not been on herbal supplements. He is not diabetic.     Imaging: Lumbar spine MRI 01//2018  1.  Bilateral pars interarticularis defects at L5 resulting in grade I   anterolisthesis of L5 on S1 and moderate to severe bilateral neural   foraminal narrowing.  See detailed description above. 2.  Eccentric left disc bulge at L3-L4 results in moderate spinal canal   stenosis with asymmetric narrowing of the left lateral recess and mass   effect upon the descending left L4 nerve root.  Correlate with   distribution of left lower extremity radicular symptoms. 3.  Low-grade degenerative changes elsewhere within the lumbar spine, as   described. 4.  Indeterminate marrow space lesion within the S1 vertebral body on the   right.  While this is favored to represent an atypical hemangioma, it is   indeterminate on this examination.  Consider further evaluation with a CT   scan or nuclear medicine bone scan for confirmation of benignity. 5.  Presumed cystic lesion within the right neural foramen at L5-S1.    This is favored to represent a benign perineural cyst.  A cystic   schwannoma cannot be completely excluded.  Consider further evaluation   with a contrast enhanced MRI in this region to exclude enhancement of   this structure.     EMG 05/2018  Left L5 radiculopathy     Previous treatments: medications. .         Potential Aberrant Drug-Related Behavior:    No    Urine Drug Screening:  None no opioids started     OARRS report:  09/2020 consistent. No past medical history on file. Past Surgical History:   Procedure Laterality Date    ANESTHESIA NERVE BLOCK Left 3/21/2019    LEFT L5 AND S1 TRANSFORAMINAL EPIDURAL STEROID INJECTION performed by Tracy Pereyra MD at [address] Left 4/18/2019    LEFT L5 AND S1 TRANSFORAMINAL EPIDURAL STEROID INJECTION performed by Tracy Pereyra MD at [address] Left 11/14/2019    LUMBAR TRANSFORAMINAL EPIDURAL STEROID INJECTION LEFT L5 AND S1 UNDER FLUOROSCOPIC GUIDANCE WITHOUT SEDATION (CPT 29768) performed by Tracy Pereyra MD at [facility] [address]      thumb    KNEE ARTHROSCOPY      NERVE BLOCK Left 03/21/2019    lumbar transforaminal epidural    NERVE BLOCK Left 04/18/2019    NERVE BLOCK Left 11/14/2019    lumbar transforaminal epidural steroid injection left L5 and S1     Prior to Admission medications    Medication Sig Start Date End Date Taking? Authorizing Provider   HYDROcodone-acetaminophen (NORCO) 7.5-325 MG per tablet TAKE 1 TABLET BY MOUTH EVERY 12 HOURS AS NEEDED 5/11/20  Yes Historical Provider, MD   NONFORMULARY every 8 hours as needed Indications: lidocaine patches   Yes Historical Provider, MD   gabapentin (NEURONTIN) 100 MG capsule Take 2 capsules by mouth 2 times daily for 30 days.  12/12/19 1/11/20  ASHLEY Cash CNP     No Known Allergies    Social History     Socioeconomic History    Marital status: Single     Spouse name: Not on file    Number of children: Not on file    Years of education: Not on file    Highest education level: Not on file   Occupational History    Not on file   Social Needs    Financial resource strain: Not on file    Food insecurity     Worry: Not on file     Inability: Not on file    Transportation needs     Medical: Not on file     Non-medical: Not on file   Tobacco Use    Smoking status: Never Smoker    Smokeless tobacco: Never Used   Substance and Sexual Activity    Alcohol use: No    Drug use: No    Sexual activity: Not on file   Lifestyle    Physical activity     Days per week: Not on file     Minutes per session: Not on file    Stress: Not on file   Relationships    Social connections     Talks on phone: Not on file     Gets together: Not on file     Attends [employer] service: Not on file     Active member of club or organization: Not on file     Attends meetings of clubs or organizations: Not on file     Relationship status: Not on file    Intimate partner violence     Fear of current or ex partner: Not on file     Emotionally abused: Not on file     Physically abused: Not on file     Forced sexual activity: Not on file   Other Topics Concern    Not on file   Social History Narrative    Not on file     Family History   Problem Relation Age of Onset    Cancer Father     Coronary Art Dis Father     Hypertension Father      REVIEW OF SYSTEMS:     Solmon Aid denies fever/chills, chest pain, shortness of breath, new bowel or bladder complaints. All other review of systems was negative. PHYSICAL EXAMINATION:      /72   Pulse 84   Temp 97.8 °F (36.6 °C)   Resp 18   Ht 5' 11\" (1.803 m)   Wt 230 lb (104.3 kg)   SpO2 96%   BMI 32.08 kg/m²     General:       General appearance:   pleasant and well-hydrated.    , in moderate discomfort and A & O x3  Build:Normal Weight  Function:Rises from a seated position easily.     HEENT:     Head:normocephalic and atraumatic  Sclera: icterus absent,      Lungs:     Breathing:Breathing Pattern: regular, no distress     Abdomen:     Shape:non-distended and normal  Tenderness:none     Lumbar spine:     Spine inspection:normal   CVA tenderness:No   Palpation:tenderness paravertebral muscles, facet loading, left positive   Range of motion:abnormal mildly Lateral bending,

## 2020-09-28 NOTE — PROGRESS NOTES
Do you currently have any of the following:    Fever: No  Headache:  no  Cough: No  Shortness of breath: No  Exposed to anyone with these symptoms: No                                                                                                                Sanjuana Hull presents to the [address] [address] on 9/28/2020. Josselyn Lora is complaining of pain in the back and down the leg . The pain is constant. The pain is described as aching, throbbing, shooting, stabbing and burning. Pain is rated on his best day at a 3, on his worst day at a 10, and on average at a 3 on the VAS scale. He took his last dose of Norco last nite. Josselyn Lora does  have issues with constipation. Any procedures since your last visit: No, with none % relief. He is not on NSAIDS and  is not on anticoagulation medication  Pacemaker or defibrilator: No Physician managing device is none. /72   Pulse 84   Temp 97.8 °F (36.6 °C)   Resp 18   Ht 5' 11\" (1.803 m)   Wt 230 lb (104.3 kg)   SpO2 96%   BMI 32.08 kg/m²      No LMP for male patient.

## 2020-10-02 ENCOUNTER — TELEPHONE (OUTPATIENT)
Dept: PAIN MANAGEMENT | Age: 45
End: 2020-10-02

## 2020-10-02 NOTE — TELEPHONE ENCOUNTER
10-2-20-received a fax from [facility] that they need more information for Darrell's workers comp case and for the procedure scheduled with Dr Wes Valenzuela. Information sent.   Christine Baker  Pain Management

## 2020-10-21 ENCOUNTER — HOSPITAL ENCOUNTER (OUTPATIENT)
Age: 45
Discharge: HOME | End: 2020-10-23
Payer: COMMERCIAL

## 2020-10-21 PROCEDURE — U0003 INFECTIOUS AGENT DETECTION BY NUCLEIC ACID (DNA OR RNA); SEVERE ACUTE RESPIRATORY SYNDROME CORONAVIRUS 2 (SARS-COV-2) (CORONAVIRUS DISEASE [COVID-19]), AMPLIFIED PROBE TECHNIQUE, MAKING USE OF HIGH THROUGHPUT TECHNOLOGIES AS DESCRIBED BY CMS-2020-01-R: HCPCS

## 2020-10-23 LAB
SARS-COV-2: NOT DETECTED
SOURCE: NORMAL

## 2020-10-28 ENCOUNTER — HOSPITAL ENCOUNTER (OUTPATIENT)
Dept: OPERATING ROOM | Age: 45
Setting detail: OUTPATIENT SURGERY
Discharge: HOME | End: 2020-10-28
Attending: PAIN MEDICINE
Payer: COMMERCIAL

## 2020-10-28 ENCOUNTER — HOSPITAL ENCOUNTER (OUTPATIENT)
Age: 45
Setting detail: OUTPATIENT SURGERY
Discharge: HOME | End: 2020-10-28
Attending: PAIN MEDICINE | Admitting: PAIN MEDICINE
Payer: COMMERCIAL

## 2020-10-28 VITALS
OXYGEN SATURATION: 95 % | RESPIRATION RATE: 16 BRPM | HEART RATE: 71 BPM | BODY MASS INDEX: 32.2 KG/M2 | HEIGHT: 71 IN | WEIGHT: 230 LBS | SYSTOLIC BLOOD PRESSURE: 136 MMHG | DIASTOLIC BLOOD PRESSURE: 73 MMHG | TEMPERATURE: 97.7 F

## 2020-10-28 PROCEDURE — 2500000003 HC RX 250 WO HCPCS: Performed by: PAIN MEDICINE

## 2020-10-28 PROCEDURE — 3209999900 FLUORO FOR SURGICAL PROCEDURES

## 2020-10-28 PROCEDURE — 3600000005 HC SURGERY LEVEL 5 BASE: Performed by: PAIN MEDICINE

## 2020-10-28 PROCEDURE — 7100000011 HC PHASE II RECOVERY - ADDTL 15 MIN: Performed by: PAIN MEDICINE

## 2020-10-28 PROCEDURE — 7100000010 HC PHASE II RECOVERY - FIRST 15 MIN: Performed by: PAIN MEDICINE

## 2020-10-28 PROCEDURE — 64484 NJX AA&/STRD TFRM EPI L/S EA: CPT | Performed by: PAIN MEDICINE

## 2020-10-28 PROCEDURE — 6360000004 HC RX CONTRAST MEDICATION: Performed by: PAIN MEDICINE

## 2020-10-28 PROCEDURE — 64483 NJX AA&/STRD TFRM EPI L/S 1: CPT | Performed by: PAIN MEDICINE

## 2020-10-28 PROCEDURE — 2709999900 HC NON-CHARGEABLE SUPPLY: Performed by: PAIN MEDICINE

## 2020-10-28 PROCEDURE — 6360000002 HC RX W HCPCS: Performed by: PAIN MEDICINE

## 2020-10-28 PROCEDURE — 3600000015 HC SURGERY LEVEL 5 ADDTL 15MIN: Performed by: PAIN MEDICINE

## 2020-10-28 RX ORDER — LIDOCAINE HYDROCHLORIDE 5 MG/ML
INJECTION, SOLUTION INFILTRATION; INTRAVENOUS PRN
Status: DISCONTINUED | OUTPATIENT
Start: 2020-10-28 | End: 2020-10-28 | Stop reason: ALTCHOICE

## 2020-10-28 ASSESSMENT — PAIN DESCRIPTION - DESCRIPTORS: DESCRIPTORS: ACHING;NAGGING

## 2020-10-28 ASSESSMENT — PAIN SCALES - GENERAL
PAINLEVEL_OUTOF10: 0
PAINLEVEL_OUTOF10: 0

## 2020-10-28 NOTE — OP NOTE
10/28/2020    Patient: Brent Wilks  :  1975  Age:  54 y.o. Sex:  male     PRE-OPERATIVE DIAGNOSIS: Lumbar disc displacement, lumbar neural foraminal stenosis, lumbar radiculopathy. POST-OPERATIVE DIAGNOSIS: Same. PROCEDURE: Left Transforaminal epidural steroid injection under fluoroscopic guidance at foraminal level L5 and S1 (#1). SURGEON: MOY Bran M.D. ANESTHESIA: Local    ESTIMATED BLOOD LOSS: None.  ______________________________________________________________________  BRIEF HISTORY: Brent Wilks comes in today for the first Left transforaminal epidural steroid injection under fluoroscopic guidance at foraminal level L5 and S1 . The potential complications of this procedure were discussed with him again today. He has elected to undergo the aforementioned procedure. Joanne complete History & Physical examination were reviewed in depth, a copy of which is in the chart. DESCRIPTION OF PROCEDURE:    After confirming written and informed consent, a time-out was performed and Joanne name and date of birth, the procedure to be performed as well as the plan for the location of the needle insertion were confirmed. The patient was brought into the procedure room and placed in the prone position on the fluoroscopy table. Standard monitors were placed and vital signs were observed throughout the procedure. The area of the lumbar spine was prepped with chloraprep and draped in a sterile manner. The vertebral body was identified with AP fluoroscopy. An oblique view was obtained to better visualize the inferior junction of the pedicle and transverse process . The 6 o'clock position of the pedicle was marked and identified. The skin and subcutaneous tissue were anesthetized with 0.5% lidocaine. A # 22 gauge pencil point needle was directed toward the targeted point under fluoroscopy until bone was contacted.  The needle was then walked inferiorly until the neural foramen was entered . A lateral fluoroscopic view was then used to place the needle tip in the middle of the foramen. Negative aspiration was confirmed for blood and CSF and 0.5 cc of Omnipaque 240 contrast was injected at each level under live fluoroscopy. Appropriate neurograms were observed under AP fluoroscopy. Then after negative aspiration, a solution of the 2 cc of 0.5% lidocaine and 40 mg DepoMedrol was easily injected at each level. The needles were removed with constant aspiration technique. The patient back was cleaned and a bandage was placed over the needle insertion points    Disposition the patient tolerated the procedure well and there were no complications . Vital signs remained stable throughout the procedure. The patient was escorted to the recovery area where they remained until discharge and written discharge instructions for the procedure were given. Plan: [place] will return to our pain management center as scheduled.      Tabby Jain MD

## 2020-10-28 NOTE — H&P
[address]  [address] [facility], [address] [place] [address] [place]  [phone]    Procedure History & Physical      Alver Sour     HPI:    Patient  is here for low back and Left leg pain for Left L5 and S1 TFESI  Labs/imaging studies reviewed   All question and concerns addressed including R/B/A associated with the procedure    No past medical history on file. Past Surgical History:   Procedure Laterality Date    ANESTHESIA NERVE BLOCK Left 3/21/2019    LEFT L5 AND S1 TRANSFORAMINAL EPIDURAL STEROID INJECTION performed by Asaf Steven MD at [address] Left 4/18/2019    LEFT L5 AND S1 TRANSFORAMINAL EPIDURAL STEROID INJECTION performed by Asaf Steven MD at  [address] Left 11/14/2019    LUMBAR TRANSFORAMINAL EPIDURAL STEROID INJECTION LEFT L5 AND S1 UNDER FLUOROSCOPIC GUIDANCE WITHOUT SEDATION (CPT 21936) performed by Asaf Steven MD at [place] [address]      thumb    KNEE ARTHROSCOPY      NERVE BLOCK Left 03/21/2019    lumbar transforaminal epidural    NERVE BLOCK Left 04/18/2019    NERVE BLOCK Left 11/14/2019    lumbar transforaminal epidural steroid injection left L5 and S1       Prior to Admission medications    Medication Sig Start Date End Date Taking? Authorizing Provider   HYDROcodone-acetaminophen (NORCO) 7.5-325 MG per tablet TAKE 1 TABLET BY MOUTH EVERY 12 HOURS AS NEEDED 5/11/20  Yes Historical Provider, MD   gabapentin (NEURONTIN) 100 MG capsule Take 2 capsules by mouth 2 times daily for 30 days. Patient taking differently: Take 200 mg by mouth daily.   12/12/19 10/20/20 Yes ASHLEY Santiago - CNP   NONFORMULARY every 8 hours as needed Indications: lidocaine patches    Historical Provider, MD       No Known Allergies    Social History     Socioeconomic History    Marital status: Single     Spouse name: Not on file    Number of children: Not on file    Years of education: Not on file    Highest education level: Not on file   Occupational History    Not on file   Social Needs    Financial resource strain: Not on file    Food insecurity     Worry: Not on file     Inability: Not on file    Transportation needs     Medical: Not on file     Non-medical: Not on file   Tobacco Use    Smoking status: Never Smoker    Smokeless tobacco: Never Used   Substance and Sexual Activity    Alcohol use: No    Drug use: No    Sexual activity: Not on file   Lifestyle    Physical activity     Days per week: Not on file     Minutes per session: Not on file    Stress: Not on file   Relationships    Social connections     Talks on phone: Not on file     Gets together: Not on file     Attends Shinto service: Not on file     Active member of club or organization: Not on file     Attends meetings of clubs or organizations: Not on file     Relationship status: Not on file    Intimate partner violence     Fear of current or ex partner: Not on file     Emotionally abused: Not on file     Physically abused: Not on file     Forced sexual activity: Not on file   Other Topics Concern    Not on file   Social History Narrative    Not on file       Family History   Problem Relation Age of Onset    Cancer Father     Coronary Art Dis Father     Hypertension Father          REVIEW OF SYSTEMS:    CONSTITUTIONAL:  negative for  fevers, chills, sweats and fatigue    RESPIRATORY:  negative for  dry cough, cough with sputum, dyspnea, wheezing and chest pain    CARDIOVASCULAR:  negative for chest pain, dyspnea, palpitations, syncope    GASTROINTESTINAL:  negative for nausea, vomiting, change in bowel habits, diarrhea, constipation and abdominal pain    MUSCULOSKELETAL: negative for muscle weakness    SKIN: negative for itching or rashes.     BEHAVIOR/PSYCH:  negative for poor appetite, increased appetite, decreased sleep and poor concentration    All other systems negative      PHYSICAL EXAM: VITALS:  /65   Pulse 78   Temp 97.7 °F (36.5 °C) (Temporal)   Resp 16   Ht 5' 11\" (1.803 m)   Wt 230 lb (104.3 kg)   SpO2 95%   BMI 32.08 kg/m²     CONSTITUTIONAL:  awake, alert, cooperative, no apparent distress, and appears stated age    EYES: PERRLA, EOMI    LUNGS:  No increased work of breathing, no audible wheezing    CARDIOVASCULAR:  regular rate and rhythm    ABDOMEN:  Soft non tender non distended     EXTREMITIES: no signs of clubbing or cyanosis. MUSCULOSKELETAL: negative for flaccid muscle tone or spastic movements. SKIN: gross examination reveals no signs of rashes, or diaphoresis. NEURO: Cranial nerves II-XII grossly intact. No signs of agitated mood. Assessment/Plan:    Low back and Left leg pain for Left L5 and S1 TFESI.

## 2020-12-09 ENCOUNTER — OFFICE VISIT (OUTPATIENT)
Dept: PAIN MANAGEMENT | Age: 45
End: 2020-12-09
Payer: COMMERCIAL

## 2020-12-09 VITALS
DIASTOLIC BLOOD PRESSURE: 70 MMHG | WEIGHT: 230 LBS | SYSTOLIC BLOOD PRESSURE: 116 MMHG | TEMPERATURE: 98.7 F | BODY MASS INDEX: 32.2 KG/M2 | HEIGHT: 71 IN | HEART RATE: 72 BPM | RESPIRATION RATE: 18 BRPM | OXYGEN SATURATION: 97 %

## 2020-12-09 PROCEDURE — 99213 OFFICE O/P EST LOW 20 MIN: CPT | Performed by: PAIN MEDICINE

## 2020-12-09 NOTE — PROGRESS NOTES
[address] [facility], [address] [place] [address] [place]  [phone]    Follow up Note      Jesus Tamika     Date of Visit:  12/9/2020    CC:  Patient presents for follow up   Chief Complaint   Patient presents with    Follow-up     lower back down left leg to thigh     HPI:    Follow up on his low back pain with radiation to the Left lower extremity with no acute issues. Change in quality of symptoms:no. Medication side effects:none. Recent diagnostic testing:none. Recent interventional procedures:Left L5 and S1 TFESI good outcome 70% improvement in his pain    He has not been on anticoagulation medications to include none and has not been on herbal supplements. He is not diabetic.     Imaging: Lumbar spine MRI 01//2018  1.  Bilateral pars interarticularis defects at L5 resulting in grade I   anterolisthesis of L5 on S1 and moderate to severe bilateral neural   foraminal narrowing.  See detailed description above. 2.  Eccentric left disc bulge at L3-L4 results in moderate spinal canal   stenosis with asymmetric narrowing of the left lateral recess and mass   effect upon the descending left L4 nerve root.  Correlate with   distribution of left lower extremity radicular symptoms. 3.  Low-grade degenerative changes elsewhere within the lumbar spine, as   described. 4.  Indeterminate marrow space lesion within the S1 vertebral body on the   right.  While this is favored to represent an atypical hemangioma, it is   indeterminate on this examination.  Consider further evaluation with a CT   scan or nuclear medicine bone scan for confirmation of benignity. 5.  Presumed cystic lesion within the right neural foramen at L5-S1.    This is favored to represent a benign perineural cyst.  A cystic   schwannoma cannot be completely excluded.  Consider further evaluation   with a contrast enhanced MRI in this region to exclude enhancement of   this structure.     EMG 05/2018 Left L5 radiculopathy     Previous treatments: medications. .         Potential Aberrant Drug-Related Behavior:    No    Urine Drug Screening:  None no opioids started     OARRS report:  12/2020 consistent. History reviewed. No pertinent past medical history. Past Surgical History:   Procedure Laterality Date    ANESTHESIA NERVE BLOCK Left 3/21/2019    LEFT L5 AND S1 TRANSFORAMINAL EPIDURAL STEROID INJECTION performed by Mike Estrella MD at [address] Left 4/18/2019    LEFT L5 AND S1 TRANSFORAMINAL EPIDURAL STEROID INJECTION performed by Mike Estrella MD at [address] Left 11/14/2019    LUMBAR TRANSFORAMINAL EPIDURAL STEROID INJECTION LEFT L5 AND S1 UNDER FLUOROSCOPIC GUIDANCE WITHOUT SEDATION (CPT 79692) performed by iMke Estrella MD at [place] [address]      thumb    KNEE ARTHROSCOPY      NERVE BLOCK Left 03/21/2019    lumbar transforaminal epidural    NERVE BLOCK Left 04/18/2019    NERVE BLOCK Left 11/14/2019    lumbar transforaminal epidural steroid injection left L5 and S1    NERVE BLOCK Left 10/28/2020    left l5 and s1 transforaminal epidural     NERVE BLOCK Left 10/28/2020    LEFT L5 AND S1 TRANSFORAMINAL EPIDURAL STEROID INJECTION (CPT 79631,92898) performed by Mike Estrella MD at [address] [place] [address]     Prior to Admission medications    Medication Sig Start Date End Date Taking? Authorizing Provider   HYDROcodone-acetaminophen (NORCO) 7.5-325 MG per tablet TAKE 1 TABLET BY MOUTH EVERY 12 HOURS AS NEEDED 5/11/20   Historical Provider, MD   gabapentin (NEURONTIN) 100 MG capsule Take 2 capsules by mouth 2 times daily for 30 days. Patient taking differently: Take 200 mg by mouth daily.   12/12/19 10/20/20  SAHLEY Bragg CNP   NONFORMULARY every 8 hours as needed Indications: lidocaine patches    Historical Provider, MD     No Known Allergies    Social History     Socioeconomic History    Marital status: Single     Spouse name: Not on file    Number of children: Not on file    Years of education: Not on file    Highest education level: Not on file   Occupational History    Not on file   Social Needs    Financial resource strain: Not on file    Food insecurity     Worry: Not on file     Inability: Not on file    Transportation needs     Medical: Not on file     Non-medical: Not on file   Tobacco Use    Smoking status: Never Smoker    Smokeless tobacco: Never Used   Substance and Sexual Activity    Alcohol use: No    Drug use: No    Sexual activity: Yes   Lifestyle    Physical activity     Days per week: Not on file     Minutes per session: Not on file    Stress: Not on file   Relationships    Social connections     Talks on phone: Not on file     Gets together: Not on file     Attends [employer] service: Not on file     Active member of club or organization: Not on file     Attends meetings of clubs or organizations: Not on file     Relationship status: Not on file    Intimate partner violence     Fear of current or ex partner: Not on file     Emotionally abused: Not on file     Physically abused: Not on file     Forced sexual activity: Not on file   Other Topics Concern    Not on file   Social History Narrative    Not on file     Family History   Problem Relation Age of Onset    Cancer Father     Coronary Art Dis Father     Hypertension Father      REVIEW OF SYSTEMS:     Shelby Khanna denies fever/chills, chest pain, shortness of breath, new bowel or bladder complaints. All other review of systems was negative. PHYSICAL EXAMINATION:      /70   Pulse 72   Temp 98.7 °F (37.1 °C) (Infrared)   Resp 18   Ht 5' 11\" (1.803 m)   Wt 230 lb (104.3 kg)   SpO2 97%   BMI 32.08 kg/m²     General:       General appearance:   pleasant and well-hydrated.    , in mild discomfort and A & O x3  Build:Normal Weight  Function:Rises from a seated position easily.     HEENT:     Head:normocephalic and atraumatic  Sclera: icterus absent,      Lungs:     Breathing:Breathing Pattern: regular, no distress     Abdomen:     Shape:non-distended and normal  Tenderness:none     Lumbar spine:     Spine inspection:normal   CVA tenderness:No   Palpation:tenderness paravertebral muscles, facet loading, left positive   Range of motion:abnormal mildly Lateral bending, flexion, extension rotation bilateral and is  painful.     Musculoskeletal:     Trigger points in Paraveteral:absent bilaterally  SI joint tenderness:negative right, negative left  SLR:negative right, negative left, sitting      Extremities:     Tremors:None bilaterally upper and lower  Range of motion pain with internal rotation of hips negative. Intact:Yes  Edema:Normal     Neurological:     Sensory:diminished to light touch Left L5 dermatome   Motor:               Right Quadriceps5/5          Left Quadriceps5/5           Right Gastrocnemius5/5    Left Gastrocnemius5/5  Right Ant Tibialis5/5  Left Ant Tibialis5/5  Reflexes:    Right Quadriceps reflex2+  Left Quadriceps reflex2+  Right Achilles reflex2+  Left Achilles reflex2+  Gait:normal     Dermatology:     Skin:no unusual rashes and no skin lesions     Impression:    Chronic low back pain with radiation to the Left lower extremity along the Left L5 dermatome   Patient had seen Sorin Crwes and is a candidate for surgery if conservative treatment fails (L5-S1 posterior interbody fusion)  Lumbar spine MRI 01/2018  Grade 1 anterolisthesis with moderate to severe foraminal stenosis. Moderate relief with physical therapy in the past.  Plan:  Follow up on his low back pain with radiation to the Left lower extremity. Patient had good outcome with Left L5 and S1 TFESI with more than 70% improvement in his pain up till now  Continue with Gabapentin 100 mg and Norco 7.5/325 mg daily. OARRS report reviewed 12/2020.   Patient encouraged to stay active,   Treatment plan discussed with the patient including medications

## 2020-12-09 NOTE — PROGRESS NOTES
Do you currently have any of the following:    Fever: No  Headache:  No  Cough: No  Shortness of breath: No  Exposed to anyone with these symptoms: No                                                                                                                Pleasant Caleb presents to the [address] on 12/9/2020. Bindu Denny is complaining of pain lower back and down left leg. The pain is intermittent. The pain is described as aching, throbbing, dull, sharp, tender and numb. Pain is rated on his best day at a 3, on his worst day at a 8, and on average at a 4 on the VAS scale. He took his last dose of Norco and Neurontin today. Bindu Denny does have issues with constipation. Any procedures since your last visit: Yes, with 70 % relief. He is not on NSAIDS and  is not on anticoagulation medications to include none and is managed by . Pacemaker or defibrilator: No Physician managing device is . Medication Contract and Consent for Opioid Use Documents Filed     Patient Documents       Type of Document Status Date Received Received By Description     Medication Contract Received 2/28/2019  9:30 AM Ethan FRANKS pain management patient agreement 02/28/2019                   Temp 98.7 °F (37.1 °C) (Infrared)   Resp 18   Ht 5' 11\" (1.803 m)   Wt 230 lb (104.3 kg)   BMI 32.08 kg/m²      No LMP for male patient.

## 2022-04-08 ENCOUNTER — OFFICE VISIT (OUTPATIENT)
Dept: PAIN MANAGEMENT | Age: 47
End: 2022-04-08
Payer: COMMERCIAL

## 2022-04-08 VITALS
HEIGHT: 71 IN | OXYGEN SATURATION: 97 % | WEIGHT: 230 LBS | DIASTOLIC BLOOD PRESSURE: 90 MMHG | RESPIRATION RATE: 16 BRPM | HEART RATE: 78 BPM | TEMPERATURE: 96.9 F | SYSTOLIC BLOOD PRESSURE: 132 MMHG | BODY MASS INDEX: 32.2 KG/M2

## 2022-04-08 DIAGNOSIS — M54.16 LUMBAR RADICULOPATHY: Primary | ICD-10-CM

## 2022-04-08 DIAGNOSIS — G89.4 CHRONIC PAIN SYNDROME: ICD-10-CM

## 2022-04-08 DIAGNOSIS — M43.16 SPONDYLOLISTHESIS OF LUMBAR REGION: ICD-10-CM

## 2022-04-08 PROCEDURE — 99214 OFFICE O/P EST MOD 30 MIN: CPT | Performed by: PAIN MEDICINE

## 2022-04-08 PROCEDURE — 99213 OFFICE O/P EST LOW 20 MIN: CPT | Performed by: PAIN MEDICINE

## 2022-04-08 RX ORDER — CAMPHOR, MENTHOL 11; 16 G/100ML; G/100ML
SPRAY TOPICAL
COMMUNITY

## 2022-04-08 NOTE — PROGRESS NOTES
excluded.  Consider further evaluation   with a contrast enhanced MRI in this region to exclude enhancement of   this structure. EMG 05/2018  Left L5 radiculopathy     Previous treatments: medications. .         Potential Aberrant Drug-Related Behavior:    No    Urine Drug Screening:  None no opioids started     OARRS report:  04/2022 consistent. Opioid agreement:  Renewal date:N/A    History reviewed. No pertinent past medical history. Past Surgical History:   Procedure Laterality Date    ANESTHESIA NERVE BLOCK Left 3/21/2019    LEFT L5 AND S1 TRANSFORAMINAL EPIDURAL STEROID INJECTION performed by Aida Medrano MD at [address] Left 4/18/2019    LEFT L5 AND S1 TRANSFORAMINAL EPIDURAL STEROID INJECTION performed by Aida Medrano MD at [address] Left 11/14/2019    LUMBAR TRANSFORAMINAL EPIDURAL STEROID INJECTION LEFT L5 AND S1 UNDER FLUOROSCOPIC GUIDANCE WITHOUT SEDATION (CPT 17426) performed by Aida Medrano MD at [place] [address]      thumb    KNEE ARTHROSCOPY      NERVE BLOCK Left 03/21/2019    lumbar transforaminal epidural    NERVE BLOCK Left 04/18/2019    NERVE BLOCK Left 11/14/2019    lumbar transforaminal epidural steroid injection left L5 and S1    NERVE BLOCK Left 10/28/2020    left l5 and s1 transforaminal epidural     NERVE BLOCK Left 10/28/2020    LEFT L5 AND S1 TRANSFORAMINAL EPIDURAL STEROID INJECTION (CPT 65839,59142) performed by Aida Medrano MD at [address] [place] [address]     Prior to Admission medications    Medication Sig Start Date End Date Taking?  Authorizing Provider   Menthol-Camphor (ICY HOT ADVANCED PAIN RELIEF) 16-11 % CREA Apply topically   Yes Historical Provider, MD   HYDROcodone-acetaminophen (NORCO) 7.5-325 MG per tablet TAKE 1 TABLET BY MOUTH EVERY 12 HOURS AS NEEDED 5/11/20  Yes Historical Provider, MD   gabapentin (NEURONTIN) 100 MG capsule Take 2 capsules by mouth 2 times daily for Housing in the Last Year: Not on file    Number of Places Lived in the Last Year: Not on file    Unstable Housing in the Last Year: Not on file     Family History   Problem Relation Age of Onset    Cancer Father     Coronary Art Dis Father     Hypertension Father      REVIEW OF SYSTEMS:     Aroldo Navarro denies fever/chills, chest pain, shortness of breath, new bowel or bladder complaints. All other review of systems was negative. PHYSICAL EXAMINATION:      BP (!) 132/90   Pulse 78   Temp 96.9 °F (36.1 °C) (Infrared)   Resp 16   Ht 5' 11\" (1.803 m)   Wt 230 lb (104.3 kg)   SpO2 97%   BMI 32.08 kg/m²     General:       General appearance:   pleasant and well-hydrated. , in moderate discomfort and A & O x3  Build:Normal Weight  Function:Rises from a seated position easily.     HEENT:     Head:normocephalic and atraumatic  Sclera: icterus absent,      Lungs:     Breathing:Breathing Pattern: regular, no distress     Abdomen:     Shape:non-distended and normal  Tenderness:none     Lumbar spine:     Spine inspection:normal   CVA tenderness:No   Palpation:tenderness paravertebral muscles, facet loading, left positive   Range of motion:abnormal mildly Lateral bending, flexion, extension rotation bilateral and is  painful.     Musculoskeletal:     Trigger points in Paraveteral:absent bilaterally  SI joint tenderness:negative right, negative left  SLR:negative right, positive left, sitting      Extremities:     Tremors:None bilaterally upper and lower  Range of motion pain with internal rotation of hips negative.   Intact:Yes  Edema:Normal     Neurological:     Sensory:diminished to light touch Left L5 dermatome   Motor:               Right Quadriceps5/5          Left Quadriceps5/5           Right Gastrocnemius5/5    Left Gastrocnemius5/5  Right Ant Tibialis5/5  Left Ant Tibialis5/5  Reflexes:    Right Quadriceps reflex2+  Left Quadriceps reflex2+  Right Achilles reflex2+  Left Achilles reflex2+  Gait:normal     Dermatology:     Skin:no unusual rashes and no skin lesions     Impression:    Chronic low back pain with radiation to the Left lower extremity along the Left L5 dermatome   Patient had seen Rashaun Bill and is a candidate for surgery if conservative treatment fails (L5-S1 posterior interbody fusion)  Lumbar spine MRI 01/2018  Grade 1 anterolisthesis with moderate to severe foraminal stenosis. Moderate relief with physical therapy in the past.  Plan:  Office extension for worsening low back pain with radiation to the Left lower extremity, last seen 12/2020. Reviewed lumbar spine MRI from 2018. Discussed repeat Left L5 and S1 TFESI, patient had it before in 2020 with good outcome and is asking to repeat will schedule. Will consider updated lumbar spine MRI if his pain is not improving. Continue with Gabapentin 100 mg and Norco 7.5/325 mg daily. OARRS report reviewed 04/2022. Patient encouraged to stay active,   Treatment plan discussed with the patient including medications and procedure side effects.     ccreferring physic    MOY Whitfield M.D.

## 2022-04-08 NOTE — PROGRESS NOTES
Do you currently have any of the following:    Fever: No  Headache:  No  Cough: No  Shortness of breath: No  Exposed to anyone with these symptoms: No                                                                                                                Tad Laurel presents to the [address] [address] on 4/8/2022. Lorena Malave is complaining of pain in his lower back and left leg. . The pain is intermittent. The pain is described as aching, throbbing, shooting and sharp. Pain is rated on his best day at a 2, on his worst day at a 9, and on average at a 5 on the VAS scale. He took his last dose of Norco and Neurontin . Lorena Malave does not have issues with constipation. Any procedures since your last visit: No,       He is not on NSAIDS and  is not on anticoagulation medications to include none and is managed by NA. Pacemaker or defibrillator: No Physician managing device is NA. Medication Contract and Consent for Opioid Use Documents Filed     Patient Documents     Type of Document Status Date Received Received By Description    Medication Contract Received 2/28/2019  9:30 AM Lona FRANKS pain management patient agreement 02/28/2019                   BP (!) 132/90   Pulse 78   Temp 96.9 °F (36.1 °C) (Infrared)   Resp 16   Ht 5' 11\" (1.803 m)   Wt 230 lb (104.3 kg)   SpO2 97%   BMI 32.08 kg/m²      No LMP for male patient.

## 2022-04-21 ENCOUNTER — TELEPHONE (OUTPATIENT)
Dept: PAIN MANAGEMENT | Age: 47
End: 2022-04-21

## 2022-04-21 NOTE — TELEPHONE ENCOUNTER
Call to Melissa Oliver that procedure was approved for 4/25/2022 and that the surgery center should call him a few days before for the pre op call and after 3:00 PM the business day before with the arrival time. Instructed Karley Corona to hold ibuprofen for 24 hours, naprosyn for 4 days and any aspirin containing products or fish oil for 7 days. Instructed to call office back if any questions. Karley Corona verbalized understanding.

## 2022-04-25 ENCOUNTER — HOSPITAL ENCOUNTER (OUTPATIENT)
Age: 47
Setting detail: OUTPATIENT SURGERY
Discharge: HOME | End: 2022-04-25
Attending: PAIN MEDICINE | Admitting: PAIN MEDICINE
Payer: COMMERCIAL

## 2022-04-25 ENCOUNTER — HOSPITAL ENCOUNTER (OUTPATIENT)
Dept: OPERATING ROOM | Age: 47
Setting detail: OUTPATIENT SURGERY
Discharge: HOME | End: 2022-04-25
Attending: PAIN MEDICINE
Payer: COMMERCIAL

## 2022-04-25 VITALS
DIASTOLIC BLOOD PRESSURE: 81 MMHG | HEART RATE: 60 BPM | OXYGEN SATURATION: 100 % | HEIGHT: 71 IN | BODY MASS INDEX: 31.27 KG/M2 | SYSTOLIC BLOOD PRESSURE: 129 MMHG | WEIGHT: 223.4 LBS | TEMPERATURE: 96.1 F | RESPIRATION RATE: 18 BRPM

## 2022-04-25 DIAGNOSIS — M51.9 LUMBAR DISC DISEASE: ICD-10-CM

## 2022-04-25 PROCEDURE — 64484 NJX AA&/STRD TFRM EPI L/S EA: CPT | Performed by: PAIN MEDICINE

## 2022-04-25 PROCEDURE — 3209999900 FLUORO FOR SURGICAL PROCEDURES

## 2022-04-25 PROCEDURE — 6360000002 HC RX W HCPCS: Performed by: PAIN MEDICINE

## 2022-04-25 PROCEDURE — 6360000004 HC RX CONTRAST MEDICATION: Performed by: PAIN MEDICINE

## 2022-04-25 PROCEDURE — 3600000005 HC SURGERY LEVEL 5 BASE: Performed by: PAIN MEDICINE

## 2022-04-25 PROCEDURE — 2709999900 HC NON-CHARGEABLE SUPPLY: Performed by: PAIN MEDICINE

## 2022-04-25 PROCEDURE — 2500000003 HC RX 250 WO HCPCS: Performed by: PAIN MEDICINE

## 2022-04-25 PROCEDURE — 64483 NJX AA&/STRD TFRM EPI L/S 1: CPT | Performed by: PAIN MEDICINE

## 2022-04-25 PROCEDURE — 7100000011 HC PHASE II RECOVERY - ADDTL 15 MIN: Performed by: PAIN MEDICINE

## 2022-04-25 PROCEDURE — 7100000010 HC PHASE II RECOVERY - FIRST 15 MIN: Performed by: PAIN MEDICINE

## 2022-04-25 RX ORDER — LIDOCAINE HYDROCHLORIDE 5 MG/ML
INJECTION, SOLUTION INFILTRATION; INTRAVENOUS PRN
Status: DISCONTINUED | OUTPATIENT
Start: 2022-04-25 | End: 2022-04-25 | Stop reason: ALTCHOICE

## 2022-04-25 ASSESSMENT — PAIN DESCRIPTION - DESCRIPTORS: DESCRIPTORS: DULL

## 2022-04-25 ASSESSMENT — PAIN SCALES - GENERAL
PAINLEVEL_OUTOF10: 0
PAINLEVEL_OUTOF10: 0

## 2022-04-25 ASSESSMENT — PAIN - FUNCTIONAL ASSESSMENT: PAIN_FUNCTIONAL_ASSESSMENT: 0-10

## 2022-04-25 NOTE — PROGRESS NOTES
Dr Jonatan Pepper aware pt will be driving self home,states pt needs to stay in pacu 30 minutes post op per [facility] staff.

## 2022-04-25 NOTE — OP NOTE
2022    Patient: Everett Madrid  :  1975  Age:  51 y.o. Sex:  male     PRE-OPERATIVE DIAGNOSIS: Lumbar disc displacement, lumbar neural foraminal stenosis, lumbar radiculopathy. POST-OPERATIVE DIAGNOSIS: Same. PROCEDURE: Left Transforaminal epidural steroid injection under fluoroscopic guidance at foraminal level L5 and S (#1). SURGEON: MOY Rao M.D. ANESTHESIA: Local    ESTIMATED BLOOD LOSS: None.  ______________________________________________________________________  BRIEF HISTORY: Everett Madrid comes in today for the first Left transforaminal epidural steroid injection under fluoroscopic guidance at foraminal level L5 and S1 . The potential complications of this procedure were discussed with him again today. He has elected to undergo the aforementioned procedure. Joanne complete History & Physical examination were reviewed in depth, a copy of which is in the chart. DESCRIPTION OF PROCEDURE:    After confirming written and informed consent, a time-out was performed and Joanne name and date of birth, the procedure to be performed as well as the plan for the location of the needle insertion were confirmed. The patient was brought into the procedure room and placed in the prone position on the fluoroscopy table. Standard monitors were placed and vital signs were observed throughout the procedure. The area of the lumbar spine was prepped with chloraprep and draped in a sterile manner. The vertebral body was identified with AP fluoroscopy. An oblique view was obtained to better visualize the inferior junction of the pedicle and transverse process . The 6 o'clock position of the pedicle was marked and identified. The skin and subcutaneous tissue were anesthetized with 0.5% lidocaine. A # 22 gauge pencil point needle was directed toward the targeted point under fluoroscopy until bone was contacted.  The needle was then walked inferiorly until the neural foramen was entered . A lateral fluoroscopic view was then used to place the needle tip in the middle of the foramen. Negative aspiration was confirmed for blood and CSF and 0.5 cc of Omnipaque 240 contrast was injected at each level under live fluoroscopy. Appropriate neurograms were observed under AP fluoroscopy. Then after negative aspiration, a solution of the 2 cc of 0.5% lidocaine and 40 mg DepoMedrol was easily injected at each level. The needles were removed with constant aspiration technique. The patient back was cleaned and a bandage was placed over the needle insertion points    Disposition the patient tolerated the procedure well and there were no complications . Vital signs remained stable throughout the procedure. The patient was escorted to the recovery area where they remained until discharge and written discharge instructions for the procedure were given. Plan: Gerardo Allison will return to our pain management center as scheduled.      Richard Walsh MD

## 2022-04-25 NOTE — H&P
[facility]  [address] [facility], [address] [place] [address] [place]  [phone]    Procedure History & Physical      Jyoti Huizar     HPI:    Patient  is here for low back and left lower extremity pain for Left L5 and S1 TFESI. Labs/imaging studies reviewed   All question and concerns addressed including R/B/A associated with the procedure    History reviewed. No pertinent past medical history. Past Surgical History:   Procedure Laterality Date    ANESTHESIA NERVE BLOCK Left 3/21/2019    LEFT L5 AND S1 TRANSFORAMINAL EPIDURAL STEROID INJECTION performed by Jenny Domingo MD at [address] Left 4/18/2019    LEFT L5 AND S1 TRANSFORAMINAL EPIDURAL STEROID INJECTION performed by Jenny Domingo MD at [address] Left 11/14/2019    LUMBAR TRANSFORAMINAL EPIDURAL STEROID INJECTION LEFT L5 AND S1 UNDER FLUOROSCOPIC GUIDANCE WITHOUT SEDATION (CPT 47949) performed by Jenny Domingo MD at [place] [address]      thumb    KNEE ARTHROSCOPY      NERVE BLOCK Left 03/21/2019    lumbar transforaminal epidural    NERVE BLOCK Left 04/18/2019    NERVE BLOCK Left 11/14/2019    lumbar transforaminal epidural steroid injection left L5 and S1    NERVE BLOCK Left 10/28/2020    left l5 and s1 transforaminal epidural     NERVE BLOCK Left 10/28/2020    LEFT L5 AND S1 TRANSFORAMINAL EPIDURAL STEROID INJECTION (CPT 16139,98236) performed by Jenny Domingo MD at [address] [place] [address]       Prior to Admission medications    Medication Sig Start Date End Date Taking? Authorizing Provider   Menthol-Camphor (ICY HOT ADVANCED PAIN RELIEF) 16-11 % CREA Apply topically    Historical Provider, MD   HYDROcodone-acetaminophen (NORCO) 7.5-325 MG per tablet TAKE 1 TABLET BY MOUTH EVERY 12 HOURS AS NEEDED 5/11/20   Historical Provider, MD   gabapentin (NEURONTIN) 100 MG capsule Take 2 capsules by mouth 2 times daily for 30 days.   Patient History   Problem Relation Age of Onset    Cancer Father     Coronary Art Dis Father     Hypertension Father          REVIEW OF SYSTEMS:    CONSTITUTIONAL:  negative for  fevers, chills, sweats and fatigue    RESPIRATORY:  negative for  dry cough, cough with sputum, dyspnea, wheezing and chest pain    CARDIOVASCULAR:  negative for chest pain, dyspnea, palpitations, syncope    GASTROINTESTINAL:  negative for nausea, vomiting, change in bowel habits, diarrhea, constipation and abdominal pain    MUSCULOSKELETAL: negative for muscle weakness    SKIN: negative for itching or rashes. BEHAVIOR/PSYCH:  negative for poor appetite, increased appetite, decreased sleep and poor concentration    All other systems negative      PHYSICAL EXAM:    VITALS:  BP (!) 105/53   Pulse 80   Temp 96.1 °F (35.6 °C) (Temporal)   Resp 20   Ht 5' 11\" (1.803 m)   Wt 223 lb 6.4 oz (101.3 kg)   SpO2 95%   BMI 31.16 kg/m²     CONSTITUTIONAL:  awake, alert, cooperative, no apparent distress, and appears stated age    EYES: PERRLA, EOMI    LUNGS:  No increased work of breathing, no audible wheezing    CARDIOVASCULAR:  regular rate and rhythm    ABDOMEN:  Soft non tender non distended     EXTREMITIES: no signs of clubbing or cyanosis. MUSCULOSKELETAL: negative for flaccid muscle tone or spastic movements. SKIN: gross examination reveals no signs of rashes, or diaphoresis. NEURO: Cranial nerves II-XII grossly intact. No signs of agitated mood. Assessment/Plan:    Low back and Left lower extremity pain for Left L5 and S1 TFESI.

## 2022-05-03 ENCOUNTER — OFFICE VISIT (OUTPATIENT)
Dept: PAIN MANAGEMENT | Age: 47
End: 2022-05-03
Payer: COMMERCIAL

## 2022-05-03 VITALS
WEIGHT: 223 LBS | TEMPERATURE: 97.3 F | HEART RATE: 64 BPM | HEIGHT: 71 IN | OXYGEN SATURATION: 97 % | DIASTOLIC BLOOD PRESSURE: 80 MMHG | BODY MASS INDEX: 31.22 KG/M2 | SYSTOLIC BLOOD PRESSURE: 126 MMHG

## 2022-05-03 DIAGNOSIS — M54.16 LUMBAR RADICULOPATHY: Primary | ICD-10-CM

## 2022-05-03 DIAGNOSIS — G89.4 CHRONIC PAIN SYNDROME: ICD-10-CM

## 2022-05-03 DIAGNOSIS — M43.16 SPONDYLOLISTHESIS OF LUMBAR REGION: ICD-10-CM

## 2022-05-03 PROCEDURE — 99213 OFFICE O/P EST LOW 20 MIN: CPT | Performed by: PAIN MEDICINE

## 2022-05-03 NOTE — PROGRESS NOTES
[address] [facility], [address] [place] [address] [place]  [phone]    Follow up Note      Marianne Thomas     Date of Visit:  5/3/2022    CC:  Patient presents for follow up   Chief Complaint   Patient presents with    Follow Up After Procedure     Left Transforaminal epidural steroid injection under fluoroscopic guidance at foraminal level L5 and S (#1). HPI:  Follow up on his low back pain with radiation to the Left lower extremity with no acute issues. Change in quality of symptoms:no. Medication side effects:none. Recent diagnostic testing:none. Recent interventional procedures:Left L5 and S1 TFESI good outcome. He has not been on anticoagulation medications to include none and has not been on herbal supplements. He is not diabetic.     Imaging: Lumbar spine MRI 01//2018  1.  Bilateral pars interarticularis defects at L5 resulting in grade I   anterolisthesis of L5 on S1 and moderate to severe bilateral neural   foraminal narrowing.  See detailed description above. 2.  Eccentric left disc bulge at L3-L4 results in moderate spinal canal   stenosis with asymmetric narrowing of the left lateral recess and mass   effect upon the descending left L4 nerve root.  Correlate with   distribution of left lower extremity radicular symptoms. 3.  Low-grade degenerative changes elsewhere within the lumbar spine, as   described. 4.  Indeterminate marrow space lesion within the S1 vertebral body on the   right.  While this is favored to represent an atypical hemangioma, it is   indeterminate on this examination.  Consider further evaluation with a CT   scan or nuclear medicine bone scan for confirmation of benignity.   5.  Presumed cystic lesion within the right neural foramen at L5-S1.    This is favored to represent a benign perineural cyst.  A cystic   schwannoma cannot be completely excluded.  Consider further evaluation   with a contrast enhanced MRI in this region to exclude enhancement of   this structure. EMG 05/2018  Left L5 radiculopathy     Previous treatments: medications. .         Potential Aberrant Drug-Related Behavior:    No    Urine Drug Screening:  None no opioids started     OARRS report:  05/2022 consistent. Opioid agreement:  Renewal date:N/A    No past medical history on file. Past Surgical History:   Procedure Laterality Date    ANESTHESIA NERVE BLOCK Left 3/21/2019    LEFT L5 AND S1 TRANSFORAMINAL EPIDURAL STEROID INJECTION performed by Andreea Hutchinson MD at [address] Left 4/18/2019    LEFT L5 AND S1 TRANSFORAMINAL EPIDURAL STEROID INJECTION performed by Andreea Hutchinson MD at  [address] Left 11/14/2019    LUMBAR TRANSFORAMINAL EPIDURAL STEROID INJECTION LEFT L5 AND S1 UNDER FLUOROSCOPIC GUIDANCE WITHOUT SEDATION (CPT 91835) performed by Andreea Hutchinson MD at [address]    KNEE ARTHROSCOPY      NERVE BLOCK Left 03/21/2019    lumbar transforaminal epidural    NERVE BLOCK Left 04/18/2019    NERVE BLOCK Left 11/14/2019    lumbar transforaminal epidural steroid injection left L5 and S1    NERVE BLOCK Left 10/28/2020    left l5 and s1 transforaminal epidural     NERVE BLOCK Left 10/28/2020    LEFT L5 AND S1 TRANSFORAMINAL EPIDURAL STEROID INJECTION (CPT 69556,99199) performed by Andreea Hutchinson MD at [facility] Left 4/25/2022    LEFT L5 AND S1 TRANSFORAMINAL EPIDURAL STEROID INJECTION (CPT 70594) performed by Andreea Hutchinson MD at [address] [place] [address]     Prior to Admission medications    Medication Sig Start Date End Date Taking?  Authorizing Provider   Menthol-Camphor (ICY HOT ADVANCED PAIN RELIEF) 16-11 % CREA Apply topically   Yes Historical Provider, MD   HYDROcodone-acetaminophen (NORCO) 7.5-325 MG per tablet TAKE 1 TABLET BY MOUTH EVERY 12 HOURS AS NEEDED 5/11/20  Yes Historical Provider, MD   gabapentin (NEURONTIN) 100 MG capsule Take 2 capsules by mouth 2 times daily for 30 days. Patient taking differently: Take 200 mg by mouth daily. 12/12/19 5/3/22 Yes ASHLEY Hernandez CNP     No Known Allergies    Social History     Socioeconomic History    Marital status: Single     Spouse name: Not on file    Number of children: Not on file    Years of education: Not on file    Highest education level: Not on file   Occupational History    Not on file   Tobacco Use    Smoking status: Never Smoker    Smokeless tobacco: Never Used   Vaping Use    Vaping Use: Never used   Substance and Sexual Activity    Alcohol use: No    Drug use: No    Sexual activity: Yes   Other Topics Concern    Not on file   Social History Narrative    Not on file     Social Determinants of Health     Financial Resource Strain:     Difficulty of Paying Living Expenses: Not on file   Food Insecurity:     Worried About Running Out of Food in the Last Year: Not on file    Carmelo of Food in the Last Year: Not on file   Transportation Needs:     Lack of Transportation (Medical): Not on file    Lack of Transportation (Non-Medical):  Not on file   Physical Activity:     Days of Exercise per Week: Not on file    Minutes of Exercise per Session: Not on file   Stress:     Feeling of Stress : Not on file   Social Connections:     Frequency of Communication with Friends and Family: Not on file    Frequency of Social Gatherings with Friends and Family: Not on file    Attends Gnosticist Services: Not on file    Active Member of Clubs or Organizations: Not on file    Attends Club or Organization Meetings: Not on file    Marital Status: Not on file   Intimate Partner Violence:     Fear of Current or Ex-Partner: Not on file    Emotionally Abused: Not on file    Physically Abused: Not on file    Sexually Abused: Not on file   Housing Stability:     Unable to Pay for Housing in the Last Year: Not on file    Number of [place] in the Last Year: Not on file    Unstable Housing in the Last Year: Not on file     Family History   Problem Relation Age of Onset    Cancer Father     Coronary Art Dis Father     Hypertension Father      REVIEW OF SYSTEMS:     Chloe Moraes denies fever/chills, chest pain, shortness of breath, new bowel or bladder complaints. All other review of systems was negative. PHYSICAL EXAMINATION:      Temp 97.3 °F (36.3 °C) (Infrared)   Ht 5' 11\" (1.803 m)   Wt 223 lb (101.2 kg)   BMI 31.10 kg/m²     General:       General appearance:   pleasant and well-hydrated. , in mild to moderate discomfort and A & O x3  Build:Normal Weight  Function:Rises from a seated position easily.     HEENT:     Head:normocephalic and atraumatic  Sclera: icterus absent,      Lungs:     Breathing:Breathing Pattern: regular, no distress     Abdomen:     Shape:non-distended and normal  Tenderness:none     Lumbar spine:     Spine inspection:normal   CVA tenderness:No   Palpation:tenderness paravertebral muscles, facet loading, left positive   Range of motion:not tested      Musculoskeletal:     Trigger points in Paraveteral:absent bilaterally  SI joint tenderness:negative right, negative left  SLR:negative right, negative left, sitting      Extremities:     Tremors:None bilaterally upper and lower  Range of motion pain with internal rotation of hips negative.   Intact:Yes  Edema:Normal     Neurological:     Sensory:diminished to light touch Left L5 dermatome   Motor:               Right Quadriceps5/5          Left Quadriceps5/5           Right Gastrocnemius5/5    Left Gastrocnemius5/5  Right Ant Tibialis5/5  Left Ant Tibialis5/5  Reflexes:    Right Quadriceps reflex2+  Left Quadriceps reflex2+  Right Achilles reflex2+  Left Achilles reflex2+  Gait:normal     Dermatology:     Skin:no unusual rashes and no skin lesions     Impression:    Chronic low back pain with radiation to the Left lower extremity along the Left L5 dermatome   Patient had seen [facility] and is a candidate for surgery if conservative treatment fails (L5-S1 posterior interbody fusion)  Lumbar spine MRI 01/2018  Grade 1 anterolisthesis with moderate to severe foraminal stenosis. Moderate relief with physical therapy in the past.  Plan:  Follow up on his low back pain with radiation to the Left lower extremity with no acute issues. Patient is s/p Left L5 and S1 TFESI with good outcome, will repeat as needed. Continue with Gabapentin 100 mg and Norco 7.5/325 mg daily. OARRS report reviewed 05/2022. Patient encouraged to stay active,   Treatment plan discussed with the patient including medications and procedure side effects.     ccreferring physic    MOY Whitfield M.D.

## 2022-05-03 NOTE — PROGRESS NOTES
Do you currently have any of the following:    Fever: No  Headache:  No  Cough: No  Shortness of breath: No  Exposed to anyone with these symptoms: No         Marlyn Stroud presents to the [facility] on 5/3/2022. Kisha Figueroa is complaining of pain in back. The pain is constant. The pain is described as aching, shooting, stabbing, sharp and burning and numbness. Pain is rated on his best day at a 2, on his worst day at a 10, and on average at a 5 on the VAS scale. He took his last dose of Norco lastnight. Any procedures since your last visit: Yes, with 55 % relief. Pacemaker or defibrillator: No.    He is not on NSAIDS and is not on anticoagulation medications to include none and is managed by none. Medication Contract and Consent for Opioid Use Documents Filed     Patient Documents     Type of Document Status Date Received Received By Description    Medication Contract Received 2/28/2019  9:30 AM Saima FRANKS pain management patient agreement 02/28/2019                Temp 97.3 °F (36.3 °C) (Infrared)   Ht 5' 11\" (1.803 m)   Wt 223 lb (101.2 kg)   BMI 31.10 kg/m²      No LMP for male patient.

## 2022-11-30 DIAGNOSIS — M25.561 RIGHT KNEE PAIN, UNSPECIFIED CHRONICITY: Primary | ICD-10-CM

## 2022-12-12 ENCOUNTER — OFFICE VISIT (OUTPATIENT)
Dept: ORTHOPEDIC SURGERY | Age: 47
End: 2022-12-12
Payer: COMMERCIAL

## 2022-12-12 VITALS — WEIGHT: 230 LBS | TEMPERATURE: 98 F | BODY MASS INDEX: 32.2 KG/M2 | HEIGHT: 71 IN

## 2022-12-12 DIAGNOSIS — S83.91XA SPRAIN OF RIGHT KNEE, UNSPECIFIED LIGAMENT, INITIAL ENCOUNTER: Primary | ICD-10-CM

## 2022-12-12 PROCEDURE — 99203 OFFICE O/P NEW LOW 30 MIN: CPT | Performed by: ORTHOPAEDIC SURGERY

## 2022-12-12 SDOH — HEALTH STABILITY: PHYSICAL HEALTH: ON AVERAGE, HOW MANY MINUTES DO YOU ENGAGE IN EXERCISE AT THIS LEVEL?: 20 MIN

## 2022-12-12 SDOH — HEALTH STABILITY: PHYSICAL HEALTH: ON AVERAGE, HOW MANY DAYS PER WEEK DO YOU ENGAGE IN MODERATE TO STRENUOUS EXERCISE (LIKE A BRISK WALK)?: 5 DAYS
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
## 2022-12-20 ENCOUNTER — EVALUATION (OUTPATIENT)
Dept: PHYSICAL THERAPY | Age: 47
End: 2022-12-20

## 2022-12-20 DIAGNOSIS — S83.91XA SPRAIN OF RIGHT KNEE, UNSPECIFIED LIGAMENT, INITIAL ENCOUNTER: Primary | ICD-10-CM
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
## 2022-12-20 NOTE — PROGRESS NOTES
Physical Therapy Daily Treatment Note    Date: 2022  Patient Name: Chary Sullivan  : 1975   MRN: [id number]  DOInjury: 10/20/2022  DOSx: --  Referring Provider: Tg Gerber DO   [facility]     Medical Diagnosis:      Diagnosis Orders   1. Sprain of right knee, unspecified ligament, initial encounter          Ever Alvaro has symptoms of joint irritation that waxes and wanes. Motion and edema are good. Irritability seems to be low at the moment. Treatment of choice will be both open and closed chain exercises to build tissue resiliency and strength. X = TO BE PERFORMED NEXT VISIT  > = PROGRESS TO THIS    S: See nelson  O:    Time  2354-8798       Visit      Claim # [id number]  Dx: S83. 91XA  DOI: 10/20/2022   approved 12 visits through 1/15/2023 Repeat outcome measure at mid point and end. Pain    Pain 0-10/10     ROM  full     Modalities          MO   Manual         MT   Stretching       Exercise       Nustep vs Bike  X  TE   Quad sets 5 sec hold 3 x 10 reps  TE   Heel slides 3 x 10   TE   SLR 3 x 10  TE      TE      TA   Squat    TA   Step-ups - FWD  X  TA   Step-ups - LAT   TA   Step-ups - BWD    TA   Step up and over reciprocally    TA   [] TG  [] Leg Press 2-leg X  TE   [] TG  [] Leg Press 1-leg   TE   CR  X  TE   Knee Extension Machine X  TE   HS curl machine X  NR      NR               A: Tolerated well. Discussed anatomy, physiology, body mechanics, principles of loading, and progressive loading/activity. Reviewed home exercise program extensively along with instructions for ice and elevation; written copy provided.     P: Continue with rehab plan  Myron Li PT    Treatment Charges: Mins Units   Initial Evaluation 30 1   Re-Evaluation     Ther Exercise         TE 15 1   Manual Therapy     MT     Ther Activities        TA     Gait Training          GT     Neuro Re-education NR     Modalities     Non-Billable Service Time     Other     Total Time/Units 45 2

## 2022-12-22 ENCOUNTER — TREATMENT (OUTPATIENT)
Dept: PHYSICAL THERAPY | Age: 47
End: 2022-12-22

## 2022-12-22 DIAGNOSIS — S83.91XA SPRAIN OF RIGHT KNEE, UNSPECIFIED LIGAMENT, INITIAL ENCOUNTER: Primary | ICD-10-CM

## 2022-12-22 NOTE — PROGRESS NOTES
Physical Therapy Daily Treatment Note    Date: 2022  Patient Name: Miguel Angel Sidhu  : 1975   MRN: [id number]  DOInjury: 10/20/2022  DOSx: --  Referring Provider: Ami Goldstein DO   [address] [facility] [place]     Medical Diagnosis:      Diagnosis Orders   1. Sprain of right knee, unspecified ligament, initial encounter            Macydeyanira Rajan has symptoms of joint irritation that waxes and wanes. Motion and edema are good. Irritability seems to be low at the moment. Treatment of choice will be both open and closed chain exercises to build tissue resiliency and strength. X = TO BE PERFORMED NEXT VISIT  > = PROGRESS TO THIS    S: Pt reports 4/10 right knee pain  O:    Time  5027-2732       Visit    Claim # [id number]  Dx: S83. 91XA  DOI: 10/20/2022  Wc approved 12 visits through 1/15/2023 Repeat outcome measure at mid point and end. Pain    Pain 4/10     ROM  full     Modalities          MO   Manual         MT   Stretching       Exercise        Bike  X 7 min  TE   Quad sets  TE   Heel slides  TE   SLR  TE      TE      TA   Squat    TA   Step-ups - FWD  8\" 2 x 20  TA   Step-ups - LAT   TA   Step-ups - BWD    TA   Step up and over reciprocally    TA   [] TG  [x] Leg Press 2-leg 100# 2 x 20  TE   [] TG  [] Leg Press 1-leg   TE   CR  2 x 20  TE   Knee Extension Machine 20# 3 x 10  TE   HS curl machine 50# 2 x 20  NR      NR               A: Tolerated well. Added exercises above.    P: Continue with rehab plan  Nery Sawant PTA    Treatment Charges: Mins Units   Initial Evaluation     Re-Evaluation     Ther Exercise         TE 40 3   Manual Therapy     MT     Ther Activities        TA     Gait Training          GT     Neuro Re-education NR     Modalities     Non-Billable Service Time     Other     Total Time/Units 40 3

## 2022-12-28 ENCOUNTER — TREATMENT (OUTPATIENT)
Dept: PHYSICAL THERAPY | Age: 47
End: 2022-12-28

## 2022-12-28 DIAGNOSIS — S83.91XA SPRAIN OF RIGHT KNEE, UNSPECIFIED LIGAMENT, INITIAL ENCOUNTER: Primary | ICD-10-CM

## 2022-12-30 ENCOUNTER — TREATMENT (OUTPATIENT)
Dept: PHYSICAL THERAPY | Age: 47
End: 2022-12-30

## 2022-12-30 DIAGNOSIS — S83.91XA SPRAIN OF RIGHT KNEE, UNSPECIFIED LIGAMENT, INITIAL ENCOUNTER: Primary | ICD-10-CM

## 2022-12-30 NOTE — PROGRESS NOTES
Physical Therapy Daily Treatment Note    Date: 2022  Patient Name: Manuel Dejesus  : 1975   MRN: [id number]  DOInjury: 10/20/2022  DOSx: --  Referring Provider: Rashel Buitrago DO   [facility]     Medical Diagnosis:      Diagnosis Orders   1. Sprain of right knee, unspecified ligament, initial encounter          Karlee Wadsworth has symptoms of joint irritation that waxes and wanes. Motion and edema are good. Irritability seems to be low at the moment. Treatment of choice will be both open and closed chain exercises to build tissue resiliency and strength. X = TO BE PERFORMED NEXT VISIT  > = PROGRESS TO THIS    S: Pt reports 3/10 pain today. States knee feels better following PT. States he rode elliptical and knee was sore. O:    Time  6476-0295      Visit    Claim # [id number]  Dx: S83. 91XA  DOI: 10/20/2022   approved 12 visits through 1/15/2023 Repeat outcome measure at mid point and end. Pain    Pain 3/10     ROM  full     Modalities          MO   Manual         MT   Stretching       Exercise        Bike  X 7 min  TE   Quad sets  TE   Heel slides  TE   SLR  TE      TE      TA   Squat    TA   Step-ups - FWD  8\" 2 x 20  TA   Step-ups - LAT 8\" 2 x 20  TA   Step-ups - BWD    TA   Step up and over reciprocally    TA   [] TG  [x] Leg Press 2-leg 100# 3 x 20  TE   [] TG  [] Leg Press 1-leg   TE   CR  7\" step 2 x 20, 1 x 15  TE   Knee Extension Machine 20# 3 x 10  TE   HS curl machine 50# 3 x 12  NR      NR               A: Tolerated well. Able to progress reps this session.   P: Continue with rehab plan  Mercedes Casillas PTA    Treatment Charges: Mins Units   Initial Evaluation     Re-Evaluation     Ther Exercise         TE 30 2   Manual Therapy     MT     Ther Activities        TA 10 1   Gait Training          GT     Neuro Re-education NR     Modalities     Non-Billable Service Time     Other     Total Time/Units 40 3

## 2023-01-04 ENCOUNTER — TREATMENT (OUTPATIENT)
Dept: PHYSICAL THERAPY | Age: 48
End: 2023-01-04
Payer: COMMERCIAL

## 2023-01-04 DIAGNOSIS — S83.91XA SPRAIN OF RIGHT KNEE, UNSPECIFIED LIGAMENT, INITIAL ENCOUNTER: Primary | ICD-10-CM

## 2023-01-04 PROCEDURE — 97530 THERAPEUTIC ACTIVITIES: CPT

## 2023-01-04 PROCEDURE — 97110 THERAPEUTIC EXERCISES: CPT

## 2023-01-04 NOTE — PROGRESS NOTES
Physical Therapy Daily Treatment Note    Date: 2023  Patient Name: Limmie Galeazzi  : 1975   MRN: [id number]  DOInjury: 10/20/2022  DOSx: --  Referring Provider: Eloy Boeck, DO   [address] [facility]     Medical Diagnosis:      Diagnosis Orders   1. Sprain of right knee, unspecified ligament, initial encounter          Sekou Conti has symptoms of joint irritation that waxes and wanes. Motion and edema are good. Irritability seems to be low at the moment. Treatment of choice will be both open and closed chain exercises to build tissue resiliency and strength. X = TO BE PERFORMED NEXT VISIT  > = PROGRESS TO THIS    S: Pt reports increased 6/10 right knee pain located under knee cap. O:    Time  5657-9842      Visit    Claim # [id number]  Dx: [place]. 91XA  DOI: 10/20/2022   approved 12 visits through 1/15/2023 Repeat outcome measure at mid point and end. Pain    Pain 6/10     ROM  full     Modalities          MO   Manual         MT   Stretching       Exercise        Bike  X 7 min  TE   Quad sets  TE   Heel slides  TE   SLR  TE      TE      TA   Squat    TA   Step-ups - FWD  8\" 2 x 20  TA   Step-ups - LAT 8\" 2 x 20  TA   Step-ups - BWD    TA   Step up and over reciprocally    TA   [] TG  [x] Leg Press 2-leg 100# 3 x 20  TE   [] TG  [] Leg Press 1-leg   TE   CR  7\" step 1 x 20, 15, 10  TE   Knee Extension Machine 40# 3 x 12  TE   HS curl machine 50# 3 x 12  NR      NR               A: Tolerated well. Able to perform exercises as above.   P: Continue with rehab plan  Bhanu Albarran PTA    Treatment Charges: Mins Units   Initial Evaluation     Re-Evaluation     Ther Exercise         TE 30 2   Manual Therapy     MT     Ther Activities        TA 10 1   Gait Training          GT     Neuro Re-education NR     Modalities     Non-Billable Service Time     Other     Total Time/Units 40 3

## 2023-01-06 ENCOUNTER — TREATMENT (OUTPATIENT)
Dept: PHYSICAL THERAPY | Age: 48
End: 2023-01-06

## 2023-01-06 DIAGNOSIS — S83.91XA SPRAIN OF RIGHT KNEE, UNSPECIFIED LIGAMENT, INITIAL ENCOUNTER: Primary | ICD-10-CM

## 2023-01-06 NOTE — PROGRESS NOTES
Physical Therapy Daily Treatment Note    Date: 2023  Patient Name: Kathleen Roberts  : 1975   MRN: [id number]  DOInjury: 10/20/2022  DOSx: --  Referring Provider: Jarocho Terrell, [address] [place] [address]     Medical Diagnosis:     1. Sprain of right knee, unspecified ligament, initial encounter        Pam Cohen has symptoms of joint irritation that waxes and wanes. Motion and edema are good. Irritability seems to be low at the moment. Treatment of choice will be both open and closed chain exercises to build tissue resiliency and strength. X = TO BE PERFORMED NEXT VISIT  > = PROGRESS TO THIS    S: Pt reports depending on the day and movement pain location varies  O:    Time  8405-5810      Visit    Claim # [id number]  Dx: S83. 91XA  DOI: 10/20/2022  Wc approved 12 visits through 1/15/2023 Repeat outcome measure at mid point and end. Pain    Pain 6/10     ROM  full     Modalities          MO   Manual         MT   Stretching       Exercise        Bike  X 7 min  TE   Quad sets  TE   Heel slides  TE   SLR  TE      TE      TA   Squat    TA   Step-ups - FWD  8\" 2 x 20  TA   Step-ups - LAT 8\" 2 x 20  TA   Step-ups - BWD    TA   Step up and over reciprocally    TA   [] TG  [x] Leg Press 2-leg 120# 3 x 20  TE   [] TG  [] Leg Press 1-leg   TE   CR  7\" step 1 x 20, 15, 16  TE   Knee Extension Machine 40# 3 x 12  TE   HS curl machine 70# 3 x 12  NR      NR               A: Tolerated well.   Increased wt on machines  P: Continue with rehab plan  Nica Burch PTA    Treatment Charges: Mins Units   Initial Evaluation     Re-Evaluation     Ther Exercise         TE 40 3   Manual Therapy     MT     Ther Activities        TA 10 1   Gait Training          GT     Neuro Re-education NR     Modalities     Non-Billable Service Time     Other     Total Time/Units 50 4

## 2023-01-09 ENCOUNTER — TREATMENT (OUTPATIENT)
Dept: PHYSICAL THERAPY | Age: 48
End: 2023-01-09

## 2023-01-09 DIAGNOSIS — S83.91XA SPRAIN OF RIGHT KNEE, UNSPECIFIED LIGAMENT, INITIAL ENCOUNTER: Primary | ICD-10-CM

## 2023-01-09 NOTE — PROGRESS NOTES
Physical Therapy Daily Treatment Note    Date: 2023  Patient Name: Jim Pereira  : 1975   MRN: [id number]  DOInjury: 10/20/2022  DOSx: --  Referring Provider: Cruz Saenz, [address] [place] [address]     Medical Diagnosis:     Ibrahima Quijano has symptoms of joint irritation that waxes and wanes. Motion and edema are good. Irritability seems to be low at the moment. Treatment of choice will be both open and closed chain exercises to build tissue resiliency and strength. X = TO BE PERFORMED NEXT VISIT  > = PROGRESS TO THIS    S: Pt states he notices that inactivity and certain movements increase his pain. Pt states pain has remained the same since starting PT.  O:    Time  8885-1519      Visit    Claim # [id number]  Dx: S49. 91XA  DOI: 10/20/2022  Wc approved 12 visits through 1/15/2023 Repeat outcome measure at mid point and end. Pain    Pain 4/10     ROM  full     Modalities          MO   Manual         MT   Stretching       Exercise        Bike  X 7 min  TE   Quad sets  TE   Heel slides  TE   SLR  TE      TE      TA   Squat    TA   Step-ups - FWD  7\" 2 x 20  TA   Step-ups - LAT 7\" 2 x 20  TA   Step-ups - BWD    TA   Step up and over reciprocally    TA   [] TG  [x] Leg Press 2-leg 120# 3 x 20  TE   [] TG  [] Leg Press 1-leg   TE   CR  7\" step 3 x 20  TE   Knee Extension Machine 40# 3 x 12  TE   HS curl machine 70# 3 x 12  NR      NR               A: Tolerated well.   No new or significant changes this session   P: Continue with rehab plan  Burgess Jose PTA    Treatment Charges: Mins Units   Initial Evaluation     Re-Evaluation     Ther Exercise         TE 35 2   Manual Therapy     MT     Ther Activities        TA 10 1   Gait Training          GT     Neuro Re-education NR     Modalities     Non-Billable Service Time     Other     Total Time/Units 45 3

## 2023-01-11 ENCOUNTER — TREATMENT (OUTPATIENT)
Dept: PHYSICAL THERAPY | Age: 48
End: 2023-01-11

## 2023-01-11 DIAGNOSIS — S83.91XA SPRAIN OF RIGHT KNEE, UNSPECIFIED LIGAMENT, INITIAL ENCOUNTER: Primary | ICD-10-CM

## 2023-01-11 NOTE — PROGRESS NOTES
Physical Therapy Daily Treatment Note    Date: 2023  Patient Name: Carina Guillermo  : 1975   MRN: [id number]  DOInjury: 10/20/2022  DOSx: --  Referring Provider: Ferny El DO   [address] [facility]     Medical Diagnosis:     Diagnosis Orders   1. Sprain of right knee, unspecified ligament, initial encounter            Jess Christine has symptoms of joint irritation that waxes and wanes. Motion and edema are good. Irritability seems to be low at the moment. Treatment of choice will be both open and closed chain exercises to build tissue resiliency and strength. X = TO BE PERFORMED NEXT VISIT  > = PROGRESS TO THIS    S: Pt reports periodic pain when he twists at knee to move  O:    Time  8497-9142      Visit    Claim # [id number]  Dx: X15. 91XA  DOI: 10/20/2022  Wc approved 12 visits through 2023 Repeat outcome measure at mid point and end. Pain    Pain 4/10     ROM  full     Modalities          MO   Manual         MT   Stretching       Exercise        Bike  X 7 min  TE   Quad sets  TE   Heel slides  TE   SLR  TE      TE      TA   Squat    TA   Step-ups - FWD  7\" 2 x 20  TA   Step-ups - LAT 7\" 2 x 20  TA   Step-ups - BWD    TA   Step up and over reciprocally    TA   [] TG  [] Leg Press 2-leg  TE   [] TG  [x] Leg Press 1-leg 80# 3 x 15  TE   CR  7\" step 3 x 20  TE   Knee Extension Machine 15# 3 x 12 SL  TE   HS curl machine 40# 3 x 12, 30# 2 x 12 SL  TE      NR   Balance            A: Tolerated well. Progressed to single leg using nautilus equipment.   P: Continue with rehab plan  Brandon Gambino PTA    Treatment Charges: Mins Units   Initial Evaluation     Re-Evaluation     Ther Exercise         TE 10 1   Manual Therapy     MT     Ther Activities        TA 20 1   Gait Training          GT     Neuro Re-education NR     Modalities     Non-Billable Service Time 10 0   Other     Total Time/Units 40 2

## 2023-01-12 ENCOUNTER — OFFICE VISIT (OUTPATIENT)
Dept: ORTHOPEDIC SURGERY | Age: 48
End: 2023-01-12
Payer: COMMERCIAL

## 2023-01-12 DIAGNOSIS — S83.91XA SPRAIN OF RIGHT KNEE, UNSPECIFIED LIGAMENT, INITIAL ENCOUNTER: Primary | ICD-10-CM

## 2023-01-12 PROCEDURE — 20610 DRAIN/INJ JOINT/BURSA W/O US: CPT | Performed by: NURSE PRACTITIONER

## 2023-01-12 PROCEDURE — 99999 PR OFFICE/OUTPT VISIT,PROCEDURE ONLY: CPT | Performed by: NURSE PRACTITIONER

## 2023-01-12 RX ORDER — TRIAMCINOLONE ACETONIDE 40 MG/ML
40 INJECTION, SUSPENSION INTRA-ARTICULAR; INTRAMUSCULAR ONCE
Status: COMPLETED | OUTPATIENT
Start: 2023-01-12 | End: 2023-01-12

## 2023-01-12 RX ADMIN — TRIAMCINOLONE ACETONIDE 40 MG: 40 INJECTION, SUSPENSION INTRA-ARTICULAR; INTRAMUSCULAR at 10:59

## 2023-01-12 NOTE — PROGRESS NOTES
Chief Complaint   Patient presents with    Knee Pain     Right Knee, [facility] approved CSI     Injections      I will proceed with a cortisone injection in the Right knee. Verbal and written consent was obtained for the injections. The skin was prepped with alcohol. 1mL of Kenalog 40mg and 9mL of 0.25% Marcaine was  injected to Right knee. The injection was given through the lateral side of the knee. The patient tolerated the injection well. I will see the patient back in 4 weeks     Diagnosis Orders   1.  Sprain of right knee, unspecified ligament, initial encounter  [address]6462 - [place] DRAIN/INJECT LARGE JOINT/BURSA

## 2023-01-18 ENCOUNTER — TELEPHONE (OUTPATIENT)
Dept: PHYSICAL THERAPY | Age: 48
End: 2023-01-18

## 2023-01-18 NOTE — TELEPHONE ENCOUNTER
Date: 2023       Patient Name: Cortney Resendiz  : 1975      MRN: [id number]    Patient cancelled appt today. He is not feeling well.     Rubin Andrews, PTA

## 2023-01-20 ENCOUNTER — TREATMENT (OUTPATIENT)
Dept: PHYSICAL THERAPY | Age: 48
End: 2023-01-20

## 2023-01-20 DIAGNOSIS — S83.91XA SPRAIN OF RIGHT KNEE, UNSPECIFIED LIGAMENT, INITIAL ENCOUNTER: Primary | ICD-10-CM

## 2023-01-20 NOTE — PROGRESS NOTES
Physical Therapy Daily Treatment Note    Date: 2023  Patient Name: Melissa Oliver  : 1975   MRN: [id number]  DOInjury: 10/20/2022  DOSx: --  Referring Provider: Binta Eugene DO   [address] [facility]     Medical Diagnosis:     Diagnosis Orders   1. Sprain of right knee, unspecified ligament, initial encounter            Karley Corona has symptoms of joint irritation that waxes and wanes. Motion and edema are good. Irritability seems to be low at the moment. Treatment of choice will be both open and closed chain exercises to build tissue resiliency and strength. X = TO BE PERFORMED NEXT VISIT  > = PROGRESS TO THIS    S: Pt reports getting injection last week. States knee does feel better overall reporting more better days than not. PT feels his is 75% improved since injury states pain is limiting factor  O:    Time  8923-1325      Visit    Claim # [id number]  Dx: S83. 91XA  DOI: 10/20/2022   approved 12 visits through 2023 Repeat outcome measure at mid point and end. Pain    Pain 2/10     ROM  full     Modalities          MO   Manual         MT   Stretching       Exercise        Bike  X 7 min  TE   Quad sets  TE   Heel slides  TE   SLR  TE      TE      TA   Squat    TA   Step-ups - FWD  7\" 2 x 20  TA   Step-ups - LAT 7\" 2 x 20  TA   Step-ups - BWD    TA   Step up and over reciprocally    TA   [] TG  [] Leg Press 2-leg  TE   [] TG  [x] Leg Press 1-leg 80# 3 x 15  TE   CR  7\" step 3 x 20  TE   Knee Extension Machine 15# 3 x 12 SL  TE   HS curl machine 40# 3 x 12  TE      NR   Balance 3 x 30 sec           A: Tolerated well. Pt notes less pain with steps following injection.   P: Continue with rehab plan  Aurea Duarte PTA    Treatment Charges: Mins Units   Initial Evaluation     Re-Evaluation     Ther Exercise         TE 10 1   Manual Therapy     MT     Ther Activities        TA 30 2   Gait Training          GT     Neuro Re-education NR     Modalities     Non-Billable Service Time     Other     Total Time/Units 40 3

## 2023-01-23 ENCOUNTER — TELEPHONE (OUTPATIENT)
Dept: PHYSICAL THERAPY | Age: 48
End: 2023-01-23

## 2023-01-23 NOTE — TELEPHONE ENCOUNTER
Date: 2023       Patient Name: Rosa Elena Johnson  : 1975      MRN: [id number]    Patient cancelled appt. He has a flat tire. Scheduled for Wednesday.     Earmon Gitelman, PTA

## 2023-01-25 ENCOUNTER — TREATMENT (OUTPATIENT)
Dept: PHYSICAL THERAPY | Age: 48
End: 2023-01-25

## 2023-01-25 DIAGNOSIS — S83.91XA SPRAIN OF RIGHT KNEE, UNSPECIFIED LIGAMENT, INITIAL ENCOUNTER: Primary | ICD-10-CM

## 2023-01-25 NOTE — PROGRESS NOTES
Physical Therapy Daily Treatment Note    Date: 2023  Patient Name: Key Lei  : 1975   MRN: [id number]  DOInjury: 10/20/2022  DOSx: --  Referring Provider: Tuan Schmidt DO   [address] [facility]     Medical Diagnosis:     Diagnosis Orders   1. Sprain of right knee, unspecified ligament, initial encounter            Rene Kuhn has symptoms of joint irritation that waxes and wanes. Motion and edema are good. Irritability seems to be low at the moment. Treatment of choice will be both open and closed chain exercises to build tissue resiliency and strength. X = TO BE PERFORMED NEXT VISIT  > = PROGRESS TO THIS    S: States knee is a little sore about 5/10 pain today. States it is buckling from pain periodically today. States knee does feel better overall reporting more better days than not. Pt feels his is 75% improved since injury states pain is limiting factor  O:    Time  6683-9984      Visit    Claim # [id number]  Dx: [id number]. 91XA  DOI: 10/20/2022   approved 12 visits through 2023 Repeat outcome measure at mid point and end. Pain    Pain 5/10     ROM  full     Modalities          MO   Manual         MT   Stretching       Exercise        Bike  X 7 min  TE   Quad sets  TE   Heel slides  TE   SLR  TE      TE      TA   Squat    TA   Step-ups - FWD  7\" 2 x 20  TA   Step-ups - LAT 7\" 2 x 20  TA   Step-ups - BWD    TA   Step up and over reciprocally    TA   [] TG  [] Leg Press 2-leg  TE   [] TG  [x] Leg Press 1-leg 80# 3 x 15  TE   CR  7\" step 3 x 20  TE   Knee Extension Machine 15# 3 x 12 SL  TE   HS curl machine 40# 3 x 12  TE      NR   Balance 3 x 30 sec           A: Tolerated well.   Able to perform all exercises as above  P: Continue with rehab plan  Jamil Jordan PTA    Treatment Charges: Mins Units   Initial Evaluation     Re-Evaluation     Ther Exercise         TE 10 1   Manual Therapy     MT     Ther Activities        TA 30 2   Gait Training          GT Neuro Re-education NR     Modalities     Non-Billable Service Time     Other     Total Time/Units 40 3

## 2023-01-27 ENCOUNTER — TREATMENT (OUTPATIENT)
Dept: PHYSICAL THERAPY | Age: 48
End: 2023-01-27

## 2023-01-27 DIAGNOSIS — S83.91XA SPRAIN OF RIGHT KNEE, UNSPECIFIED LIGAMENT, INITIAL ENCOUNTER: Primary | ICD-10-CM

## 2023-01-27 NOTE — PROGRESS NOTES
Physical Therapy Daily Treatment Note    Date: 2023  Patient Name: Barrington Fuchs  : 1975   MRN: [id number]  DOInjury: 10/20/2022  DOSx: --  Referring Provider: Daryl Landers DO   [address] [facility]     Medical Diagnosis:     Diagnosis Orders   1. Sprain of right knee, unspecified ligament, initial encounter            Doris Naidu has symptoms of joint irritation that waxes and wanes. Motion and edema are good. Irritability seems to be low at the moment. Treatment of choice will be both open and closed chain exercises to build tissue resiliency and strength. X = TO BE PERFORMED NEXT VISIT  > = PROGRESS TO THIS    S: reports knee feels pretty good today about 2/10. States knee does feel better overall reporting more better days than not. Pt feels his is 75% improved since injury states pain is limiting factor  O:    Time  7010-8424      Visit    Claim # [id number]  Dx: S83. 91XA  DOI: 10/20/2022   approved 12 visits through 2023 Repeat outcome measure at mid point and end. Pain    Pain 5/10     ROM  full     Modalities          MO   Manual         MT   Stretching       Exercise        Bike  X 7 min  TE   Quad sets  TE   Heel slides  TE   SLR  TE      TE      TA   Squat    TA   Step-ups - FWD  7\" 2 x 20  TA   Step-ups - LAT 7\" 2 x 20  TA   Step-ups - BWD    TA   Step up and over reciprocally    TA   [] TG  [] Leg Press 2-leg  TE   [] TG  [x] Leg Press 1-leg 80# 3 x 15  TE   CR  7\" step 3 x 20  TE   Knee Extension Machine 15# 3 x 12 SL  TE   HS curl machine 40# 3 x 12  TE      NR   Balance 3 x 30 sec           A: Tolerated well.   Able to perform all exercises as above  P: Continue with rehab plan  Stephen Staggers, PTA    Treatment Charges: Mins Units   Initial Evaluation     Re-Evaluation     Ther Exercise         TE 10 1   Manual Therapy     MT     Ther Activities        TA 30 2   Gait Training          GT     Neuro Re-education NR     Modalities     Non-Billable Service Time     Other     Total Time/Units 40 3

## 2023-02-01 ENCOUNTER — TREATMENT (OUTPATIENT)
Dept: PHYSICAL THERAPY | Age: 48
End: 2023-02-01

## 2023-02-01 DIAGNOSIS — S83.91XA SPRAIN OF RIGHT KNEE, UNSPECIFIED LIGAMENT, INITIAL ENCOUNTER: Primary | ICD-10-CM

## 2023-02-01 NOTE — PROGRESS NOTES
Physical Therapy Daily Treatment Note    Date: 2023  Patient Name: Fallon Perry  : 1975   MRN: [id number]  DOInjury: 10/20/2022  DOSx: --  Referring Provider: Giovanni Sanders DO   [address] [facility] [place]     Medical Diagnosis:     Diagnosis Orders   1. Sprain of right knee, unspecified ligament, initial encounter            Dakota Mina has symptoms of joint irritation that waxes and wanes. Motion and edema are good. Irritability seems to be low at the moment. Treatment of choice will be both open and closed chain exercises to build tissue resiliency and strength. X = TO BE PERFORMED NEXT VISIT  > = PROGRESS TO THIS    S: reports knee feels pretty good today about 2/10. Reports pain ranging 2-6/10 on average. States knee does feel better overall reporting more better days than not. Pt feels his is 80% improved since injury states pain is limiting factor. O:  Lower Extremity Functional Scale (LEFs) Outcome measure: 31% disability. Time  4915-6749      Visit    Claim # [id number]  Dx: S83. 91XA  DOI: 10/20/2022   approved 12 visits through 2023 Repeat outcome measure at mid point and end. Pain    Pain 5/10     ROM  full     Modalities          MO   Manual         MT   Stretching       Exercise        Bike  X 7 min  TE   Quad sets  TE   Heel slides  TE   SLR  TE      TE      TA   Squat    TA   Step-ups - FWD  7\" 2 x 20  TA   Step-ups - LAT 7\" 2 x 20  TA   Step-ups - BWD    TA   Step up and over reciprocally    TA   [] TG  [] Leg Press 2-leg  TE   [] TG  [x] Leg Press 1-leg 80# 3 x 15  TE   CR  7\" step 3 x 20  TE   Knee Extension Machine 15# 3 x 12 SL  TE   HS curl machine 40# 3 x 12  TE      NR   Balance 3 x 30 sec           A: Tolerated well. Able to perform all exercises as above.   P: Continue with rehab plan  Meche Kim PTA    Treatment Charges: Mins Units   Initial Evaluation     Re-Evaluation     Ther Exercise         TE 10 1   Manual Therapy     MT     Ther Activities        TA 25 1   Gait Training          GT     Neuro Re-education NR     Modalities     Non-Billable Service Time 10 0   Other     Total Time/Units 45 2

## 2023-02-13 ENCOUNTER — OFFICE VISIT (OUTPATIENT)
Dept: ORTHOPEDIC SURGERY | Age: 48
End: 2023-02-13
Payer: COMMERCIAL

## 2023-02-13 VITALS — WEIGHT: 230 LBS | HEIGHT: 71 IN | BODY MASS INDEX: 32.2 KG/M2 | TEMPERATURE: 98 F

## 2023-02-13 DIAGNOSIS — S83.91XA SPRAIN OF RIGHT KNEE, UNSPECIFIED LIGAMENT, INITIAL ENCOUNTER: Primary | ICD-10-CM

## 2023-02-13 PROCEDURE — 99213 OFFICE O/P EST LOW 20 MIN: CPT | Performed by: ORTHOPAEDIC SURGERY

## 2023-02-13 NOTE — PROGRESS NOTES
Chief Complaint   Patient presents with    Knee Pain     Right knee cortisone injection follow up. WC Knee has improved since last visit but pain is now starting to return. Still not as painful as previous visit. Subjective:     Patient ID: Limmie Galeazzi is a 49 y.o..  male    Knee Pain  Patient complains of right knee pain. He had csi with good relief but pain is already returning. No past medical history on file.   Past Surgical History:   Procedure Laterality Date    ANESTHESIA NERVE BLOCK Left 3/21/2019    LEFT L5 AND S1 TRANSFORAMINAL EPIDURAL STEROID INJECTION performed by Mikey Arreola MD at [facility] Left 4/18/2019    LEFT L5 AND S1 TRANSFORAMINAL EPIDURAL STEROID INJECTION performed by Mikey Arreola MD at [facility] Left 11/14/2019    LUMBAR TRANSFORAMINAL EPIDURAL STEROID INJECTION LEFT L5 AND S1 UNDER FLUOROSCOPIC GUIDANCE WITHOUT SEDATION (CPT 66450) performed by Mikey Arreola MD at [address] [place] [address]    KNEE ARTHROSCOPY      NERVE BLOCK Left 03/21/2019    lumbar transforaminal epidural    NERVE BLOCK Left 04/18/2019    NERVE BLOCK Left 11/14/2019    lumbar transforaminal epidural steroid injection left L5 and S1    NERVE BLOCK Left 10/28/2020    left l5 and s1 transforaminal epidural     NERVE BLOCK Left 10/28/2020    LEFT L5 AND S1 TRANSFORAMINAL EPIDURAL STEROID INJECTION (CPT 42795,58713) performed by Mikey Arreola MD at [address] [place] [address] Left 4/25/2022    LEFT L5 AND S1 TRANSFORAMINAL EPIDURAL STEROID INJECTION (CPT 39000) performed by Mikey Arreola MD at [address] [place] [address]       Current Outpatient Medications:     Menthol-Camphor (ICY HOT ADVANCED PAIN RELIEF) 16-11 % CREA, Apply topically, Disp: , Rfl:     HYDROcodone-acetaminophen (NORCO) 7.5-325 MG per tablet, TAKE 1 TABLET BY MOUTH EVERY 12 HOURS AS NEEDED, Disp: , Rfl:     gabapentin (NEURONTIN) 100 MG capsule, Take 2 capsules by mouth 2 times daily for 30 days. (Patient taking differently: Take 200 mg by mouth daily. ), Disp: 120 capsule, Rfl: 1  No Known Allergies  Social History     Socioeconomic History    Marital status: Single     Spouse name: Not on file    Number of children: Not on file    Years of education: Not on file    Highest education level: Not on file   Occupational History    Not on file   Tobacco Use    Smoking status: Never    Smokeless tobacco: Never   Vaping Use    Vaping Use: Never used   Substance and Sexual Activity    Alcohol use: No    Drug use: No    Sexual activity: Yes   Other Topics Concern    Not on file   Social History Narrative    Not on file     Social Determinants of Health     Financial Resource Strain: Not on file   Food Insecurity: Not on file   Transportation Needs: Not on file   Physical Activity: Insufficiently Active    Days of Exercise per Week: 5 days    Minutes of Exercise per Session: 20 min   Stress: Not on file   Social Connections: Not on file   Intimate Partner Violence: Not At Risk    Fear of Current or Ex-Partner: No    Emotionally Abused: No    Physically Abused: No    Sexually Abused: No   Housing Stability: Not on file     Family History   Problem Relation Age of Onset    Cancer Father     Coronary Art Dis Father     Hypertension Father          REVIEW OF SYSTEMS:     General/Constitution:  (-)weight loss, (-)fever, (-)chills, (-)weakness. Skin: (-) rash,(-) psoriasis,(-) eczema, (-)skin cancer. Musculoskeletal: (-) fractures,  (-) dislocations,(-) collagen vascular disease, (-) fibromyalgia, (-) multiple sclerosis, (-) muscular dystrophy, (-) RSD,(-) joint pain (-)swelling, (-) joint pain,swelling. Neurologic: (-) epilepsy, (-)seizures,(-) brain tumor,(-) TIA, (-)stroke, (-)headaches, (-)Parkinson disease,(-) memory loss, (-) LOC. Cardiovascular: (-) Chest pain, (-) swelling in legs/feet, (-) SOB, (-) cramping in legs/feet with walking.   Respiratory: (-) SOB, (-) Coughing, (-) night sweats. GI: (-) nausea, (-) vomiting, (-) diarrhea, (-) blood in stool, (-) gastric ulcer. Psychiatric: (-) Depression, (-) Anxiety, (-) bipolar disease, (-) Alzheimer's Disease  Allergic/Immunologic: (-) allergies latex, (-) allergies metal, (-) skin sensitivity. Hematlogic: (-) anemia, (-) blood transfusion, (-) DVT/PE, (-) Clotting disorders    Subjective:    Constitution:  Temp 98 °F (36.7 °C)   Ht 5' 11\" (1.803 m)   Wt 230 lb (104.3 kg)   BMI 32.08 kg/m²     Psycihatric:  The patient is alert and oriented x 3, appears to be stated age and in no distress. Respiratory:  Respiratory effort is not labored. Patient is not gasping. Palpation of the chest reveals no tactile fremitus. Skin:  Upon inspection: the skin appears warm, dry and intact. There is  a previous scar over the affected area. There is any cellulitis, lymphedema or cutaneous lesions noted in the lower extremities. Upon palpation there is no induration noted. Neurologic:  Gait: antalgic; Motor exam of the lower extremities show ; quadriceps, hamstrings, foot dorsi and plantar flexors intact R.  5/5 and L. 5/5. Deep tendon reflexes are 2/4 at the knees and 2/4 at the ankles with strong extensor hallicus longus motor strength bilaterally. Sensory to both feet is intact to all sensory roots. Cardiovascular: The vascular exam is normal and is well perfused to distal extremities. Distal pulses DP/PT: R. 2+; L. 2+. There is cap refill noted less than two seconds in all digits. There is not edema of the bilateral lower extremities. There is not varicosities noted in the distal extremities. Lymph:  Upon palpation,  there is no lymphadenopathy noted in bilateral lower extremities. Musculoskeletal:  Gait: antalgic; examination of the nails and digits reveal no cyanosis or clubbing. Lumbar exam:  On visual inspection, there is not deformity of the spine.    full range of motion, no tenderness, palpable spasm or pain on motion. Special tests: Straight Leg Raise negative, Rajiv test negative. Hip exam:   Upon inspection, there is not deformity noted. Upon palpation there is not tenderness. ROM: is  full and symmetrical.   Strength: Hip Flexors 5/5; Hip Abductors 5/5; Hip Adduction 5/5. Knee exam:  Right knee exam shows;  range of motion of R. Knee is 0 to 120, and L. Knee is 0 to 120. The patient does have  pain on motion, effusion is mild, there is tenderness over the  medial region, there are not any masses, there is not ligamentous instability, there is not  deformity noted. Knee exam: right positive for moderate crepitations, some mild tenderness laxity is not noted with  stress. There is not a popliteal cyst.    R. Knee:  Lachman's negative, Anterior Drawer negative, Posterior Drawer negative  Ana Paula's negative, Thallasy  negative,   PF grind test negative, Apprehension test negative, Patellar J sign  negative  L. Knee:  Lachman's negative, Anterior Drawer negative, Posterior Drawer negative  Ana Paula's negative, Thallasy  negative,   PF grind test negative, Apprehension test negative,  Patellar J sign  negative    Xray Exam:  Medial joint narrowing  Radiographic findings reviewed with patient    Assessment:  Encounter Diagnoses   Name Primary? Sprain of right knee, unspecified ligament, initial encounter Yes         Plan:  Natural history and expected course discussed. Questions answered. Educational materials distributed. Rest, ice, compression, and elevation (RICE) therapy. Reduction in offending activity. I had a lengthy discussion with the patient regarding their diagnosis. I explained treatment options including surgical vs non surgical treatment. I reviewed in detail the risks and benefits and outlined the procedure in detail with expected outcomes and possible complications.   I also discussed non surgical treatment such as injections (CSI and visco supplementation), physical therapy, topical creams and NSAID's. They have elected for conservative management at this time.      Add acute on chronic chondral injury to allowed conditions  C9 for zilretta

## 2023-03-20 ENCOUNTER — OFFICE VISIT (OUTPATIENT)
Dept: ORTHOPEDIC SURGERY | Age: 48
End: 2023-03-20
Payer: COMMERCIAL

## 2023-03-20 DIAGNOSIS — S83.91XA SPRAIN OF RIGHT KNEE, UNSPECIFIED LIGAMENT, INITIAL ENCOUNTER: Primary | ICD-10-CM

## 2023-03-20 PROCEDURE — 99999 PR OFFICE/OUTPT VISIT,PROCEDURE ONLY: CPT | Performed by: ORTHOPAEDIC SURGERY

## 2023-03-20 PROCEDURE — 20610 DRAIN/INJ JOINT/BURSA W/O US: CPT | Performed by: ORTHOPAEDIC SURGERY

## 2023-03-27 NOTE — PROGRESS NOTES
Chief Complaint   Patient presents with    Knee Pain     WC right knee zilretta injection        I will proceed with a cortisone injection in the Right knee. Verbal and written consent was obtained for the injections. The skin was prepped with alcohol. A prepared mixture of 32 mg of Zilretta and 5mL diluent was injected to Right knee. The injection was given through the lateral side of the knee. The patient tolerated the injection well. I will see the patient back prn. Heather Silver was seen today for knee pain.     Diagnoses and all orders for this visit:    Sprain of right knee, unspecified ligament, initial encounter  -     VT ARTHROCENTESIS ASPIR&/INJ MAJOR JT/BURSA W/O US  -     triamcinolone acetonide (ZILRETTA) intra-articular injection 32 mg

## 2023-03-30 ENCOUNTER — OFFICE VISIT (OUTPATIENT)
Dept: PAIN MANAGEMENT | Age: 48
End: 2023-03-30

## 2023-03-30 VITALS
SYSTOLIC BLOOD PRESSURE: 110 MMHG | TEMPERATURE: 97.1 F | OXYGEN SATURATION: 95 % | WEIGHT: 230 LBS | HEART RATE: 65 BPM | DIASTOLIC BLOOD PRESSURE: 64 MMHG | BODY MASS INDEX: 32.2 KG/M2 | HEIGHT: 71 IN

## 2023-03-30 DIAGNOSIS — G89.4 CHRONIC PAIN SYNDROME: Primary | ICD-10-CM

## 2023-03-30 DIAGNOSIS — M54.16 LUMBAR RADICULOPATHY: ICD-10-CM

## 2023-03-30 DIAGNOSIS — M43.16 SPONDYLOLISTHESIS OF LUMBAR REGION: ICD-10-CM

## 2023-03-30 PROCEDURE — 99213 OFFICE O/P EST LOW 20 MIN: CPT | Performed by: PAIN MEDICINE

## 2023-03-30 RX ORDER — GABAPENTIN 300 MG/1
CAPSULE ORAL DAILY
COMMUNITY
Start: 2023-03-06

## 2023-03-30 RX ORDER — SODIUM CHLORIDE 0.9 % (FLUSH) 0.9 %
5-40 SYRINGE (ML) INJECTION EVERY 12 HOURS SCHEDULED
OUTPATIENT
Start: 2023-03-30

## 2023-03-30 RX ORDER — SODIUM CHLORIDE 0.9 % (FLUSH) 0.9 %
5-40 SYRINGE (ML) INJECTION PRN
OUTPATIENT
Start: 2023-03-30

## 2023-03-30 RX ORDER — SODIUM CHLORIDE 9 MG/ML
INJECTION, SOLUTION INTRAVENOUS PRN
OUTPATIENT
Start: 2023-03-30

## 2023-03-30 NOTE — PROGRESS NOTES
Aretha Pavon presents to the [facility] on 3/30/2023. Raul Bethea is complaining of pain in back. The pain is constant. The pain is described as aching and burning. Pain is rated on his best day at a 2, on his worst day at a 7, and on average at a 5 on the VAS scale. He took his last dose of Norco 3 days ago. Raul Bethea does not have issues with constipation. Any procedures since your last visit: No.    He is not on NSAIDS and  is not on anticoagulation medications to include none and is managed by none. Pacemaker or defibrillator: No.    Medication Contract and Consent for Opioid Use Documents Filed       Patient Documents       Type of Document Status Date Received Received By Description    Medication Contract Received 2/28/2019  9:30 AM Дмитрий FRANKS pain management patient agreement 02/28/2019                       There were no vitals taken for this visit. No LMP for male patient.

## 2023-03-30 NOTE — PROGRESS NOTES
[address] [facility], [address] [place] [address]  [phone]    Follow up Note      Estela Leon     Date of Visit:  3/30/2023    CC:  Patient presents for follow up   Chief Complaint   Patient presents with    Back Pain     HPI:  Worsening low back pain with radiation to the Left lower extremity with burning/tingling in the Left leg. Change in quality of symptoms:no. Medication side effects:none. Recent diagnostic testing:none. Recent interventional procedures:none    He has not been on anticoagulation medications to include none and has not been on herbal supplements. He is not diabetic. Imaging: Lumbar spine MRI 01//2018  1. Bilateral pars interarticularis defects at L5 resulting in grade I   anterolisthesis of L5 on S1 and moderate to severe bilateral neural   foraminal narrowing. See detailed description above. 2.  Eccentric left disc bulge at L3-L4 results in moderate spinal canal   stenosis with asymmetric narrowing of the left lateral recess and mass   effect upon the descending left L4 nerve root. Correlate with   distribution of left lower extremity radicular symptoms. 3. Low-grade degenerative changes elsewhere within the lumbar spine, as   described. 4.  Indeterminate marrow space lesion within the S1 vertebral body on the   right. While this is favored to represent an atypical hemangioma, it is   indeterminate on this examination. Consider further evaluation with a CT   scan or nuclear medicine bone scan for confirmation of benignity. 5.  Presumed cystic lesion within the right neural foramen at L5-S1. This is favored to represent a benign perineural cyst.  A cystic   schwannoma cannot be completely excluded. Consider further evaluation   with a contrast enhanced MRI in this region to exclude enhancement of   this structure. EMG 05/2018  Left L5 radiculopathy     Previous treatments: medications. .         Potential Aberrant

## 2023-04-17 ENCOUNTER — TELEPHONE (OUTPATIENT)
Dept: PAIN MANAGEMENT | Age: 48
End: 2023-04-17

## 2023-04-17 NOTE — TELEPHONE ENCOUNTER
Call to Suleiman Reynolds, left message that procedure was approved for 4/24/2023 and that [facility] should call him a few days before for the pre op call and after 3:00 PM the business day before with the arrival time. Instructed Emory Moritz to hold ibuprofen for 24 hours, naprosyn for 4 days and any aspirin containing products or fish oil for 7 days. Instructed to call office back if any questions.      Electronically signed by Mara Peraza RN on 4/17/2023 at 3:54 PM

## 2023-04-24 ENCOUNTER — HOSPITAL ENCOUNTER (OUTPATIENT)
Age: 48
Setting detail: OUTPATIENT SURGERY
Discharge: HOME | End: 2023-04-24
Attending: PAIN MEDICINE | Admitting: PAIN MEDICINE
Payer: COMMERCIAL

## 2023-04-24 ENCOUNTER — HOSPITAL ENCOUNTER (OUTPATIENT)
Dept: OPERATING ROOM | Age: 48
Discharge: HOME | End: 2023-04-24
Payer: COMMERCIAL

## 2023-04-24 VITALS
SYSTOLIC BLOOD PRESSURE: 131 MMHG | HEIGHT: 71 IN | BODY MASS INDEX: 32.2 KG/M2 | WEIGHT: 230 LBS | RESPIRATION RATE: 16 BRPM | DIASTOLIC BLOOD PRESSURE: 92 MMHG | TEMPERATURE: 97.8 F | HEART RATE: 90 BPM | OXYGEN SATURATION: 95 %

## 2023-04-24 DIAGNOSIS — M51.9 LUMBAR DISC DISEASE: ICD-10-CM

## 2023-04-24 PROCEDURE — 6360000004 HC RX CONTRAST MEDICATION: Performed by: PAIN MEDICINE

## 2023-04-24 PROCEDURE — 2500000003 HC RX 250 WO HCPCS: Performed by: PAIN MEDICINE

## 2023-04-24 PROCEDURE — 7100000010 HC PHASE II RECOVERY - FIRST 15 MIN: Performed by: PAIN MEDICINE

## 2023-04-24 PROCEDURE — 3600000005 HC SURGERY LEVEL 5 BASE: Performed by: PAIN MEDICINE

## 2023-04-24 PROCEDURE — 2709999900 HC NON-CHARGEABLE SUPPLY: Performed by: PAIN MEDICINE

## 2023-04-24 PROCEDURE — 3600000015 HC SURGERY LEVEL 5 ADDTL 15MIN: Performed by: PAIN MEDICINE

## 2023-04-24 PROCEDURE — 7100000011 HC PHASE II RECOVERY - ADDTL 15 MIN: Performed by: PAIN MEDICINE

## 2023-04-24 PROCEDURE — 6360000002 HC RX W HCPCS: Performed by: PAIN MEDICINE

## 2023-04-24 PROCEDURE — 3209999900 FLUORO FOR SURGICAL PROCEDURES

## 2023-04-24 RX ORDER — LIDOCAINE HYDROCHLORIDE 5 MG/ML
INJECTION, SOLUTION INFILTRATION; INTRAVENOUS PRN
Status: DISCONTINUED | OUTPATIENT
Start: 2023-04-24 | End: 2023-04-24 | Stop reason: ALTCHOICE

## 2023-04-24 RX ORDER — SODIUM CHLORIDE 0.9 % (FLUSH) 0.9 %
5-40 SYRINGE (ML) INJECTION PRN
Status: DISCONTINUED | OUTPATIENT
Start: 2023-04-24 | End: 2023-04-24 | Stop reason: HOSPADM

## 2023-04-24 RX ORDER — SODIUM CHLORIDE 9 MG/ML
INJECTION, SOLUTION INTRAVENOUS PRN
Status: DISCONTINUED | OUTPATIENT
Start: 2023-04-24 | End: 2023-04-24 | Stop reason: HOSPADM

## 2023-04-24 RX ORDER — SODIUM CHLORIDE 0.9 % (FLUSH) 0.9 %
5-40 SYRINGE (ML) INJECTION EVERY 12 HOURS SCHEDULED
Status: DISCONTINUED | OUTPATIENT
Start: 2023-04-24 | End: 2023-04-24 | Stop reason: HOSPADM

## 2023-04-24 ASSESSMENT — PAIN - FUNCTIONAL ASSESSMENT
PAIN_FUNCTIONAL_ASSESSMENT: PREVENTS OR INTERFERES SOME ACTIVE ACTIVITIES AND ADLS
PAIN_FUNCTIONAL_ASSESSMENT: 0-10

## 2023-04-24 ASSESSMENT — PAIN DESCRIPTION - DESCRIPTORS: DESCRIPTORS: ACHING;NAGGING

## 2023-04-24 NOTE — H&P
[address]  [address] [facility], [address] [place] [address] [place]  [phone]    Procedure History & Physical      Lucía Opoka     HPI:    Patient  is here for low back and left lower extremity pain for Left L5 and S1 TFESI. Labs/imaging studies reviewed   All question and concerns addressed including R/B/A associated with the procedure    History reviewed. No pertinent past medical history. Past Surgical History:   Procedure Laterality Date    ANESTHESIA NERVE BLOCK Left 3/21/2019    LEFT L5 AND S1 TRANSFORAMINAL EPIDURAL STEROID INJECTION performed by Regan Simms MD at [facility] Left 4/18/2019    LEFT L5 AND S1 TRANSFORAMINAL EPIDURAL STEROID INJECTION performed by Regan Simms MD at [facility] Left 11/14/2019    LUMBAR TRANSFORAMINAL EPIDURAL STEROID INJECTION LEFT L5 AND S1 UNDER FLUOROSCOPIC GUIDANCE WITHOUT SEDATION (CPT 80535) performed by Regan Simms MD at [address] [place] [address]    KNEE ARTHROSCOPY      NERVE BLOCK Left 03/21/2019    lumbar transforaminal epidural    NERVE BLOCK Left 04/18/2019    NERVE BLOCK Left 11/14/2019    lumbar transforaminal epidural steroid injection left L5 and S1    NERVE BLOCK Left 10/28/2020    left l5 and s1 transforaminal epidural     NERVE BLOCK Left 10/28/2020    LEFT L5 AND S1 TRANSFORAMINAL EPIDURAL STEROID INJECTION (CPT 24046,55770) performed by Regan Simms MD at [address] [place] [address] 4/25/2022    LEFT L5 AND S1 TRANSFORAMINAL EPIDURAL STEROID INJECTION (CPT 10754) performed by Regan Simms MD at [address] [place] [address]       Prior to Admission medications    Medication Sig Start Date End Date Taking? Authorizing Provider   gabapentin (NEURONTIN) 300 MG capsule Take by mouth daily.  3/6/23   Historical Provider, MD   Menthol-Camphor (ICY HOT ADVANCED PAIN RELIEF) 16-11 % CREA Apply topically    Historical Provider, MD

## 2023-04-24 NOTE — OP NOTE
2023    Patient: Suly Sanabria  :  1975  Age:  49 y.o. Sex:  male     PRE-OPERATIVE DIAGNOSIS: Lumbar disc displacement, lumbar neural foraminal stenosis, lumbar radiculopathy. POST-OPERATIVE DIAGNOSIS: Same. PROCEDURE: Left Transforaminal epidural steroid injection under fluoroscopic guidance at foraminal level L5 and S1 (#1). SURGEON: MOY Acevedo M.D. ANESTHESIA: Local    ESTIMATED BLOOD LOSS: None.  ______________________________________________________________________  BRIEF HISTORY: Suly Sanabria comes in today for the first Left transforaminal epidural steroid injection under fluoroscopic guidance at foraminal level L5 and S1 . The potential complications of this procedure were discussed with him again today. He has elected to undergo the aforementioned procedure. Joanne complete History & Physical examination were reviewed in depth, a copy of which is in the chart. DESCRIPTION OF PROCEDURE:    After confirming written and informed consent, a time-out was performed and Joanne name and date of birth, the procedure to be performed as well as the plan for the location of the needle insertion were confirmed. The patient was brought into the procedure room and placed in the prone position on the fluoroscopy table. Standard monitors were placed and vital signs were observed throughout the procedure. The area of the lumbar spine was prepped with chloraprep and draped in a sterile manner. The vertebral body was identified with AP fluoroscopy. An oblique view was obtained to better visualize the inferior junction of the pedicle and transverse process . The 6 o'clock position of the pedicle was marked and identified. The skin and subcutaneous tissue were anesthetized with 0.5% lidocaine. A # 22 gauge pencil point needle was directed toward the targeted point under fluoroscopy until bone was contacted.  The needle was then walked inferiorly until the neural foramen was entered

## 2023-04-24 NOTE — DISCHARGE INSTRUCTIONS
[facility]) Pain Management Department  [phone]   Post-Pain Block/ Radiofrequency Home Going Instructions    1-Go home, rest for the remainder of the day  2-Please do not lift over 20 pounds the day of the injection  3-If you received sedation No: alcohol, driving, operating lawn mowers, plows, tractors or other dangerous equipment until next morning. Do not make important decisions or sign legal documents for 24 hours. You may experience light headedness, dizziness, nausea or sleepiness after sedation. Do not stay alone. A responsible adult must be with you for 24 hours. You could be nauseated from the medications you have received. Your IV site may be sore and bruised. 4-No dietary restrictions     5-Resume all medications the same day, blood thinners to be resumed 24 hours after injection    6-Keep the surgical site clean and dry, you may shower the next morning and remove the      dressing. 7- No sitz baths, tub baths or hot tubs/swimming for 24 hours. 8- If you have any pain at the injection site(s), application of an ice pack to the area should be       helpful, 20 minutes on/20 minutes off for next 48 hours. 9- [facility] at if you develop.   Fever greater than 100.4 F  Have bleeding or drainage from the puncture site  Have progressive Leg/arm numbness and or weakness  Loss of control of bowel and or bladder (wet/soil yourself)  Severe headache with inability to lift head  10-You may return to work the next day

## 2023-05-09 ENCOUNTER — OFFICE VISIT (OUTPATIENT)
Dept: ORTHOPEDIC SURGERY | Age: 48
End: 2023-05-09
Payer: COMMERCIAL

## 2023-05-09 VITALS — TEMPERATURE: 98 F | BODY MASS INDEX: 32.2 KG/M2 | HEIGHT: 71 IN | WEIGHT: 230 LBS

## 2023-05-09 DIAGNOSIS — S83.91XA SPRAIN OF RIGHT KNEE, UNSPECIFIED LIGAMENT, INITIAL ENCOUNTER: Primary | ICD-10-CM

## 2023-05-09 DIAGNOSIS — M17.11 PRIMARY OSTEOARTHRITIS OF RIGHT KNEE: ICD-10-CM

## 2023-05-09 PROCEDURE — 99213 OFFICE O/P EST LOW 20 MIN: CPT | Performed by: ORTHOPAEDIC SURGERY

## 2023-05-09 NOTE — PROGRESS NOTES
Chief Complaint   Patient presents with    Knee Pain     WC right knee zilretta injection follow up. Injection did not show any relief since last visit. Knee feels worse then previous visit. Still having lots of swelling and having to limp to be able to walk. Nancy Vargas returns today for follow-up of his right knee pain. he reports this is worse than when I saw him last.  The patient's pain level is a 7/10. The previous treatment was not successful. No past medical history on file.   Past Surgical History:   Procedure Laterality Date    ANESTHESIA NERVE BLOCK Left 3/21/2019    LEFT L5 AND S1 TRANSFORAMINAL EPIDURAL STEROID INJECTION performed by Anne Zaragoza MD at [facility] Left 4/18/2019    LEFT L5 AND S1 TRANSFORAMINAL EPIDURAL STEROID INJECTION performed by Anne Zaragoza MD at [facility] Left 11/14/2019    LUMBAR TRANSFORAMINAL EPIDURAL STEROID INJECTION LEFT L5 AND S1 UNDER FLUOROSCOPIC GUIDANCE WITHOUT SEDATION (CPT 13623) performed by Anne Zaragoza MD at [address] [place] [address]    KNEE ARTHROSCOPY      NERVE BLOCK Left 03/21/2019    lumbar transforaminal epidural    NERVE BLOCK Left 04/18/2019    NERVE BLOCK Left 11/14/2019    lumbar transforaminal epidural steroid injection left L5 and S1    NERVE BLOCK Left 10/28/2020    left l5 and s1 transforaminal epidural     NERVE BLOCK Left 10/28/2020    LEFT L5 AND S1 TRANSFORAMINAL EPIDURAL STEROID INJECTION (CPT 04814,82074) performed by Anne Zaragoza MD at [address] [place] [address] 4/25/2022    LEFT L5 AND S1 TRANSFORAMINAL EPIDURAL STEROID INJECTION (CPT 02873) performed by Anne Zaragoza MD at [address] [place] [address] Left 4/24/2023    LEFT L5 AND S1 TRANSFORAMINAL EPIDURAL STEROID INJECTION performed by Anne Zaragoza MD at [address] [place] [address]       Current Outpatient Medications:     gabapentin (NEURONTIN) 300 MG capsule, Take

## 2023-06-09 ENCOUNTER — OFFICE VISIT (OUTPATIENT)
Dept: PAIN MANAGEMENT | Age: 48
End: 2023-06-09
Payer: COMMERCIAL

## 2023-06-09 VITALS
HEART RATE: 84 BPM | SYSTOLIC BLOOD PRESSURE: 110 MMHG | BODY MASS INDEX: 32.2 KG/M2 | RESPIRATION RATE: 18 BRPM | TEMPERATURE: 97.5 F | HEIGHT: 71 IN | OXYGEN SATURATION: 98 % | DIASTOLIC BLOOD PRESSURE: 72 MMHG | WEIGHT: 230 LBS

## 2023-06-09 DIAGNOSIS — M43.16 SPONDYLOLISTHESIS OF LUMBAR REGION: ICD-10-CM

## 2023-06-09 DIAGNOSIS — M54.16 LUMBAR RADICULOPATHY: ICD-10-CM

## 2023-06-09 DIAGNOSIS — G89.4 CHRONIC PAIN SYNDROME: Primary | ICD-10-CM

## 2023-06-09 PROCEDURE — 99213 OFFICE O/P EST LOW 20 MIN: CPT | Performed by: PAIN MEDICINE

## 2023-06-09 NOTE — PROGRESS NOTES
Melanie Doan presents to the [facility] on 6/9/2023. Edu Torres is complaining of pain in his lower back. The pain is constant The pain is described as aching and sharp. Pain is rated on his best day at a 2, on his worst day at a 6, and on average at a 3 on the VAS scale. He took his last dose of Norco and Neurontin 2 days ago. Edu Torres does not have issues with constipation. Any procedures since your last visit: Yes, with 65% relief. He is on NSAIDS and  is not on anticoagulation medications to include none and is managed by NA. Pacemaker or defibrillator: No Physician managing device is NA. Medication Contract and Consent for Opioid Use Documents Filed       Patient Documents       Type of Document Status Date Received Received By Description    Medication Contract Received 2/28/2019  9:30 AM Marcos FRANKS pain management patient agreement 02/28/2019                       Resp 18   Ht 5' 11\" (1.803 m)   Wt 230 lb (104.3 kg)   BMI 32.08 kg/m²      No LMP for male patient.
Historical Provider, MD   HYDROcodone-acetaminophen (Sonda Colace) 7.5-325 MG per tablet TAKE 1 TABLET BY MOUTH EVERY 12 HOURS AS NEEDED 5/11/20  Yes Historical Provider, MD     No Known Allergies    Social History     Socioeconomic History    Marital status: Single     Spouse name: Not on file    Number of children: Not on file    Years of education: Not on file    Highest education level: Not on file   Occupational History    Not on file   Tobacco Use    Smoking status: Never    Smokeless tobacco: Never   Vaping Use    Vaping Use: Never used   Substance and Sexual Activity    Alcohol use: No    Drug use: No    Sexual activity: Yes   Other Topics Concern    Not on file   Social History Narrative    Not on file     Social Determinants of Health     Financial Resource Strain: Not on file   Food Insecurity: Not on file   Transportation Needs: Not on file   Physical Activity: Insufficiently Active    Days of Exercise per Week: 5 days    Minutes of Exercise per Session: 20 min   Stress: Not on file   Social Connections: Not on file   Intimate Partner Violence: Not At Risk    Fear of Current or Ex-Partner: No    Emotionally Abused: No    Physically Abused: No    Sexually Abused: No   Housing Stability: Not on file     Family History   Problem Relation Age of Onset    Cancer Father     Coronary Art Dis Father     Hypertension Father      REVIEW OF SYSTEMS:     Milo Leary denies fever/chills, chest pain, shortness of breath, new bowel or bladder complaints. All other review of systems was negative. PHYSICAL EXAMINATION:      /72   Pulse 84   Temp 97.5 °F (36.4 °C) (Infrared)   Resp 18   Ht 5' 11\" (1.803 m)   Wt 230 lb (104.3 kg)   SpO2 98%   BMI 32.08 kg/m²     General:       General appearance:   pleasant and well-hydrated. , in moderate discomfort and A & O x3  Build:Normal Weight  Function:Rises from a seated position easily.      HEENT:     Head:normocephalic and atraumatic  Sclera: icterus absent,      Lungs:

## 2024-01-23 NOTE — PROGRESS NOTES
"Skin testing is highly reactive to dust mites, mold and cats.    Discussed environmental controls.    Start allergy immunotherapy.     Continue Flonase 2 sprays twice a day and decrease to 2 sprays one time a day when you improve.    If congestion persists after a month, I want him to start Singulair (montelukast) at night to help with sinus and lung inflammation.  This will be sent to the pharmacy as\"fill by request\"    " Non-Billable Service Time 15 0   Other     Total Time/Units 65 3

## 2024-05-21 ENCOUNTER — OFFICE VISIT (OUTPATIENT)
Dept: PAIN MANAGEMENT | Age: 49
End: 2024-05-21
Payer: COMMERCIAL

## 2024-05-21 VITALS
DIASTOLIC BLOOD PRESSURE: 72 MMHG | HEART RATE: 62 BPM | WEIGHT: 229.94 LBS | BODY MASS INDEX: 32.19 KG/M2 | RESPIRATION RATE: 18 BRPM | OXYGEN SATURATION: 99 % | HEIGHT: 71 IN | SYSTOLIC BLOOD PRESSURE: 118 MMHG | TEMPERATURE: 98 F

## 2024-05-21 DIAGNOSIS — M43.16 SPONDYLOLISTHESIS OF LUMBAR REGION: ICD-10-CM

## 2024-05-21 DIAGNOSIS — M54.16 LUMBAR RADICULOPATHY: ICD-10-CM

## 2024-05-21 DIAGNOSIS — G89.4 CHRONIC PAIN SYNDROME: Primary | ICD-10-CM

## 2024-05-21 PROCEDURE — 99214 OFFICE O/P EST MOD 30 MIN: CPT | Performed by: PAIN MEDICINE

## 2024-05-21 PROCEDURE — 99213 OFFICE O/P EST LOW 20 MIN: CPT | Performed by: PAIN MEDICINE

## 2024-05-21 RX ORDER — SODIUM CHLORIDE 0.9 % (FLUSH) 0.9 %
5-40 SYRINGE (ML) INJECTION EVERY 12 HOURS SCHEDULED
OUTPATIENT
Start: 2024-05-21

## 2024-05-21 RX ORDER — SODIUM CHLORIDE 0.9 % (FLUSH) 0.9 %
5-40 SYRINGE (ML) INJECTION PRN
OUTPATIENT
Start: 2024-05-21

## 2024-05-21 RX ORDER — SODIUM CHLORIDE 9 MG/ML
INJECTION, SOLUTION INTRAVENOUS PRN
OUTPATIENT
Start: 2024-05-21

## 2024-05-21 NOTE — PROGRESS NOTES
[facility]  [address], [place] [address]  [phone]    Follow up Note      Darrell Gallego     Date of Visit:  5/21/2024    CC:  Patient presents for follow up   Chief Complaint   Patient presents with    Follow-up     Lower back both sides     HPI:  Office extension for worsening low back pain with radiation to the Left lower extremity, last seen 06/2023.  Pain is described as aching/constant.  Pain is aggravated by walking and standing.  Change in quality of symptoms:no.    Medication side effects:none.   Recent diagnostic testing:Lumbar spine MRI  Recent interventional procedures:none    He has not been on anticoagulation medications to include none and has not been on herbal supplements.  He is not diabetic.     Imaging: Lumbar spine MRI 01//2018  1.  Bilateral pars interarticularis defects at L5 resulting in grade I   anterolisthesis of L5 on S1 and moderate to severe bilateral neural   foraminal narrowing.  See detailed description above.  2.  Eccentric left disc bulge at L3-L4 results in moderate spinal canal   stenosis with asymmetric narrowing of the left lateral recess and mass   effect upon the descending left L4 nerve root.  Correlate with   distribution of left lower extremity radicular symptoms.  3.  Low-grade degenerative changes elsewhere within the lumbar spine, as   described.  4.  Indeterminate marrow space lesion within the S1 vertebral body on the   right.  While this is favored to represent an atypical hemangioma, it is   indeterminate on this examination.  Consider further evaluation with a CT   scan or nuclear medicine bone scan for confirmation of benignity.  5.  Presumed cystic lesion within the right neural foramen at L5-S1.    This is favored to represent a benign perineural cyst.  A cystic   schwannoma cannot be completely excluded.  Consider further evaluation   with a contrast enhanced MRI in this region to exclude enhancement of

## 2024-05-21 NOTE — PROGRESS NOTES
Darrell Gallego presents to the [facility] on 5/21/2024. Darrell is complaining of pain of the lower back on both sides. The pain is constant. The pain is described as aching, throbbing, shooting, and sharp. Pain is rated on his best day at a 2, on his worst day at a 8, and on average at a 8 on the VAS scale. He took his last dose of Norco and Neurontin last night.      Any procedures since your last visit: No    He is not on NSAIDS and  is not on anticoagulation medications to include none    Pacemaker or defibrillator: No    Medication Contract and Consent for Opioid Use Documents Filed       Patient Documents       Type of Document Status Date Received Received By Description    Medication Contract Received 2/28/2019  9:30 AM SHAILESH OLIVAS pain management patient agreement 02/28/2019                       Temp 98 °F (36.7 °C) (Temporal)   Resp 18   Ht 1.803 m (5' 10.98\")   Wt 104.3 kg (229 lb 15 oz)   BMI 32.08 kg/m²      No LMP for male patient.

## 2024-07-11 ENCOUNTER — TELEPHONE (OUTPATIENT)
Dept: PAIN MANAGEMENT | Age: 49
End: 2024-07-11

## 2024-07-11 DIAGNOSIS — M54.16 LUMBAR RADICULOPATHY: Primary | ICD-10-CM

## 2024-07-11 DIAGNOSIS — M43.16 SPONDYLOLISTHESIS OF LUMBAR REGION: ICD-10-CM

## 2024-07-11 NOTE — TELEPHONE ENCOUNTER
Call to Darrell Gallego that procedure was scheduled for 7/25/2024 and that [facility] should call him a few days before for the pre op call and between 2:00 PM and 4:00 PM  the business day before with the arrival time. Instructed Darrell to hold ibuprofen for 24 hours, Celebrex, Mobic, and naprosyn for 4 days and any aspirin containing products, CoQ 10, or fish oil for 7 days. Instructed to call office back if any questions. Darrell verbalized understanding.    Electronically signed by Heath Armstrong RN on 7/11/2024 at 11:08 AM

## 2024-07-25 ENCOUNTER — HOSPITAL ENCOUNTER (OUTPATIENT)
Age: 49
Setting detail: OUTPATIENT SURGERY
Discharge: HOME | End: 2024-07-25
Attending: PAIN MEDICINE | Admitting: PAIN MEDICINE
Payer: COMMERCIAL

## 2024-07-25 ENCOUNTER — HOSPITAL ENCOUNTER (OUTPATIENT)
Dept: OPERATING ROOM | Age: 49
Setting detail: OUTPATIENT SURGERY
Discharge: HOME | End: 2024-07-25

## 2024-07-25 VITALS
SYSTOLIC BLOOD PRESSURE: 133 MMHG | DIASTOLIC BLOOD PRESSURE: 78 MMHG | TEMPERATURE: 98.6 F | WEIGHT: 229 LBS | OXYGEN SATURATION: 99 % | HEART RATE: 72 BPM | HEIGHT: 70 IN | RESPIRATION RATE: 14 BRPM | BODY MASS INDEX: 32.78 KG/M2

## 2024-07-25 DIAGNOSIS — M54.16 LUMBAR RADICULOPATHY: ICD-10-CM

## 2024-07-25 PROCEDURE — 7100000010 HC PHASE II RECOVERY - FIRST 15 MIN: Performed by: PAIN MEDICINE

## 2024-07-25 PROCEDURE — 2709999900 HC NON-CHARGEABLE SUPPLY: Performed by: PAIN MEDICINE

## 2024-07-25 PROCEDURE — 6360000002 HC RX W HCPCS: Performed by: PAIN MEDICINE

## 2024-07-25 PROCEDURE — 64483 NJX AA&/STRD TFRM EPI L/S 1: CPT | Performed by: PAIN MEDICINE

## 2024-07-25 PROCEDURE — 7100000011 HC PHASE II RECOVERY - ADDTL 15 MIN: Performed by: PAIN MEDICINE

## 2024-07-25 PROCEDURE — 64484 NJX AA&/STRD TFRM EPI L/S EA: CPT | Performed by: PAIN MEDICINE

## 2024-07-25 PROCEDURE — 6360000004 HC RX CONTRAST MEDICATION: Performed by: PAIN MEDICINE

## 2024-07-25 PROCEDURE — 3600000005 HC SURGERY LEVEL 5 BASE: Performed by: PAIN MEDICINE

## 2024-07-25 PROCEDURE — 2500000003 HC RX 250 WO HCPCS: Performed by: PAIN MEDICINE

## 2024-07-25 RX ORDER — SODIUM CHLORIDE 0.9 % (FLUSH) 0.9 %
5-40 SYRINGE (ML) INJECTION PRN
Status: DISCONTINUED | OUTPATIENT
Start: 2024-07-25 | End: 2024-07-25 | Stop reason: HOSPADM

## 2024-07-25 RX ORDER — LIDOCAINE HYDROCHLORIDE 5 MG/ML
INJECTION, SOLUTION INFILTRATION; INTRAVENOUS PRN
Status: DISCONTINUED | OUTPATIENT
Start: 2024-07-25 | End: 2024-07-25 | Stop reason: ALTCHOICE

## 2024-07-25 RX ORDER — SODIUM CHLORIDE 9 MG/ML
INJECTION, SOLUTION INTRAVENOUS PRN
Status: DISCONTINUED | OUTPATIENT
Start: 2024-07-25 | End: 2024-07-25 | Stop reason: HOSPADM

## 2024-07-25 RX ORDER — SODIUM CHLORIDE 0.9 % (FLUSH) 0.9 %
5-40 SYRINGE (ML) INJECTION EVERY 12 HOURS SCHEDULED
Status: DISCONTINUED | OUTPATIENT
Start: 2024-07-25 | End: 2024-07-25 | Stop reason: HOSPADM

## 2024-07-25 ASSESSMENT — PAIN - FUNCTIONAL ASSESSMENT
PAIN_FUNCTIONAL_ASSESSMENT: 0-10
PAIN_FUNCTIONAL_ASSESSMENT: NONE - DENIES PAIN
PAIN_FUNCTIONAL_ASSESSMENT: NONE - DENIES PAIN

## 2024-07-25 ASSESSMENT — PAIN DESCRIPTION - DESCRIPTORS: DESCRIPTORS: ACHING

## 2024-07-25 NOTE — OP NOTE
2024    Patient: Darrell Gallego  :  1975  Age:  49 y.o.  Sex:  male     PRE-OPERATIVE DIAGNOSIS: Lumbar disc displacement, lumbar neural foraminal stenosis, lumbar radiculopathy.     POST-OPERATIVE DIAGNOSIS: Same.    PROCEDURE: Left Transforaminal epidural steroid injection under fluoroscopic guidance at foraminal level L5 and S1 (#1).    SURGEON: Jatinder MICHAEL    ANESTHESIA: Local    ESTIMATED BLOOD LOSS: None.  ______________________________________________________________________  BRIEF HISTORY: Darrell Gallego comes in today for the first Left transforaminal epidural steroid injection under fluoroscopic guidance at foraminal level L5 and S1 . The potential complications of this procedure were discussed with him again today.  He has elected to undergo the aforementioned procedure.     Darrell’s complete History & Physical examination were reviewed in depth, a copy of which is in the chart.      DESCRIPTION OF PROCEDURE:    After confirming written and informed consent, a time-out was performed and Darrell’s name and date of birth, the procedure to be performed as well as the plan for the location of the needle insertion were confirmed.    The patient was brought into the procedure room and placed in the prone position on the fluoroscopy table. Standard monitors were placed and vital signs were observed throughout the procedure. The area of the lumbar spine was prepped with chloraprep and draped in a sterile manner. The vertebral body was identified with AP fluoroscopy. An oblique view was obtained to better visualize the inferior junction of the pedicle and transverse process . The 6 o'clock position of the pedicle was marked and identified. The skin and subcutaneous tissue were anesthetized with 0.5% lidocaine. A # 22 gauge pencil point needle was directed toward the targeted point under fluoroscopy until bone was contacted. The needle was then walked inferiorly until the neural foramen was entered

## 2024-07-25 NOTE — H&P
[facility]  [address], [place] [address]  [phone]    Procedure History & Physical      Darrell Gallego     HPI:    Patient  is here for low back and Left lower extremity pain for Left L5 and S1 TFESI.    Labs/imaging studies reviewed   All question and concerns addressed including R/B/A associated with the procedure    History reviewed. No pertinent past medical history.    Past Surgical History:   Procedure Laterality Date    ANESTHESIA NERVE BLOCK Left 3/21/2019    LEFT L5 AND S1 TRANSFORAMINAL EPIDURAL STEROID INJECTION performed by Ky Espinosa MD at [facility] OR    ANESTHESIA NERVE BLOCK Left 4/18/2019    LEFT L5 AND S1 TRANSFORAMINAL EPIDURAL STEROID INJECTION performed by Ky Espinosa MD at [facility] OR    ANESTHESIA NERVE BLOCK Left 11/14/2019    LUMBAR TRANSFORAMINAL EPIDURAL STEROID INJECTION LEFT L5 AND S1 UNDER FLUOROSCOPIC GUIDANCE WITHOUT SEDATION (CPT 53684) performed by Ky Espinosa MD at [facility] OR    FINGER TRIGGER RELEASE      thumb    KNEE ARTHROSCOPY      NERVE BLOCK Left 03/21/2019    lumbar transforaminal epidural    NERVE BLOCK Left 04/18/2019    NERVE BLOCK Left 11/14/2019    lumbar transforaminal epidural steroid injection left L5 and S1    NERVE BLOCK Left 10/28/2020    left l5 and s1 transforaminal epidural     NERVE BLOCK Left 10/28/2020    LEFT L5 AND S1 TRANSFORAMINAL EPIDURAL STEROID INJECTION (CPT 14335,62125) performed by Ky Espinosa MD at [facility] OR    NERVE BLOCK Left 4/25/2022    LEFT L5 AND S1 TRANSFORAMINAL EPIDURAL STEROID INJECTION (CPT 05599) performed by Ky Espinosa MD at [facility] OR    PAIN MANAGEMENT PROCEDURE Left 4/24/2023    LEFT L5 AND S1 TRANSFORAMINAL EPIDURAL STEROID INJECTION performed by Ky Espinosa MD at [facility] OR       Prior to Admission medications    Medication Sig Start Date End Date Taking? Authorizing Provider   gabapentin (NEURONTIN) 300 MG capsule Take by

## 2024-07-25 NOTE — DISCHARGE INSTRUCTIONS
[facility]  [phone]   Post-Pain Block/ Radiofrequency Home Going Instructions    1-Go home, rest for the remainder of the day    2-Please do not lift over 20 pounds the day of the injection    3-If you received sedation No: alcohol, driving, operating lawn mowers, plows, tractors or other dangerous equipment until next morning. Do not make important decisions or sign legal documents for 24 hours. You may experience light headedness, dizziness, nausea or sleepiness after sedation. Do not stay alone. A responsible adult must be with you for 24 hours. You could be nauseated from the medications you have received. Your IV site may be sore and bruised.    4-No dietary restrictions     5-Resume all medications the same day, blood thinners to be resumed 24 hours after injection    6-Keep the surgical site clean and dry, you may shower the next morning and remove the      dressing.     7- No sitz baths, tub baths or hot tubs/swimming for 24 hours.       8- If you have any pain at the injection site(s), application of an ice pack to the area should be       helpful, 20 minutes on/20 minutes off for next 48 hours.    9- Call [facility] pain management immediately at if you develop.  Fever greater than 100.4 F  Have bleeding or drainage from the puncture site  Have progressive Leg/arm numbness and or weakness  Loss of control of bowel and or bladder (wet/soil yourself)  Severe headache with inability to lift head    10-You may return to work the next day         Infection After Surgery: Care Instructions  Overview  After surgery, an infection is always possible. It doesn't mean that the surgery didn't go well.  Because an infection can be serious, your doctor has taken steps to manage it.  Your doctor checked the infection and cleaned it if necessary. Your doctor may have made an opening in the area so that the pus can drain out. You may have gauze in the cut so that the area will stay open and

## 2025-02-27 ENCOUNTER — APPOINTMENT (OUTPATIENT)
Dept: GENERAL RADIOLOGY | Age: 50
End: 2025-02-27
Payer: COMMERCIAL

## 2025-02-27 ENCOUNTER — HOSPITAL ENCOUNTER (EMERGENCY)
Age: 50
Discharge: HOME | End: 2025-02-27
Payer: COMMERCIAL

## 2025-02-27 VITALS
HEART RATE: 77 BPM | SYSTOLIC BLOOD PRESSURE: 131 MMHG | WEIGHT: 225 LBS | DIASTOLIC BLOOD PRESSURE: 92 MMHG | TEMPERATURE: 98.8 F | RESPIRATION RATE: 18 BRPM | BODY MASS INDEX: 32.28 KG/M2 | OXYGEN SATURATION: 99 %
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
## 2025-02-27 DIAGNOSIS — Z02.6 ENCOUNTER RELATED TO WORKER'S COMPENSATION CLAIM: ICD-10-CM

## 2025-02-27 DIAGNOSIS — M54.50 ACUTE RIGHT-SIDED LOW BACK PAIN WITHOUT SCIATICA: ICD-10-CM

## 2025-02-27 PROCEDURE — 99211 OFF/OP EST MAY X REQ PHY/QHP: CPT

## 2025-02-27 PROCEDURE — 72100 X-RAY EXAM L-S SPINE 2/3 VWS: CPT

## 2025-02-27 RX ORDER — PREDNISONE 20 MG/1
20 TABLET ORAL 2 TIMES DAILY
Qty: 10 TABLET | Refills: 0 | Status: SHIPPED | OUTPATIENT
Start: 2025-02-27 | End: 2025-03-04

## 2025-02-27 ASSESSMENT — PAIN DESCRIPTION - DESCRIPTORS: DESCRIPTORS: DULL;BURNING;DISCOMFORT

## 2025-02-27 ASSESSMENT — PAIN DESCRIPTION - LOCATION: LOCATION: BACK

## 2025-02-27 ASSESSMENT — PAIN DESCRIPTION - ORIENTATION: ORIENTATION: RIGHT;LOWER

## 2025-02-27 ASSESSMENT — PAIN DESCRIPTION - FREQUENCY: FREQUENCY: CONTINUOUS

## 2025-02-27 ASSESSMENT — PAIN SCALES - GENERAL: PAINLEVEL_OUTOF10: 7

## 2025-02-27 ASSESSMENT — PAIN - FUNCTIONAL ASSESSMENT: PAIN_FUNCTIONAL_ASSESSMENT: 0-10

## 2025-02-27 NOTE — ED PROVIDER NOTES
---------------------------------    IMPRESSION  1. Fall, initial encounter    2. Acute right-sided low back pain without sciatica    3. Encounter related to worker's compensation claim        DISPOSITION  Disposition: Discharge to home  Patient condition is good                  Sandra Jade PA-C  02/27/25 1605

## 2025-05-05 ENCOUNTER — TRANSCRIBE ORDERS (OUTPATIENT)
Dept: ADMINISTRATIVE | Age: 50
End: 2025-05-05

## 2025-05-05 DIAGNOSIS — R07.9 CHEST PAIN, UNSPECIFIED TYPE: Primary | ICD-10-CM

## (undated) DEVICE — BANDAGE ADH W1XL3IN NAT FAB WVN FLX DURABLE N ADH PD SEAL

## (undated) DEVICE — NEEDLE HYPO 18GA L1.5IN PNK POLYPR HUB S STL THN WALL FILL

## (undated) DEVICE — 3M™ RED DOT™ MONITORING ELECTRODE WITH FOAM TAPE AND STICKY GEL 2560, 50/BAG, 20/CASE, 72/PLT: Brand: RED DOT™

## (undated) DEVICE — NEEDLE HYPO 25GA L1.5IN BLU POLYPR HUB S STL REG BVL STR

## (undated) DEVICE — 12 ML SYRINGE,LUER-LOCK TIP: Brand: MONOJECT

## (undated) DEVICE — 6 ML SYRINGE LUER-LOCK TIP: Brand: MONOJECT

## (undated) DEVICE — 22GX5" WHITACRE SPINAL NEEDLE: Brand: MEDLINE

## (undated) DEVICE — APPLICATOR MEDICATED 10.5 CC SOLUTION HI LT ORNG CHLORAPREP

## (undated) DEVICE — GLOVE ORANGE PI 7 1/2   MSG9075

## (undated) DEVICE — NEEDLE SPNL 22GA L3.5IN BLK HUB S STL REG WALL FIT STYL W/

## (undated) DEVICE — GAUZE,SPONGE,4"X4",12PLY,STERILE,LF,2'S: Brand: MEDLINE

## (undated) DEVICE — NEEDLE HYPO 27GA L1.25IN GRY POLYPR HUB S STL REG BVL STR

## (undated) DEVICE — Device: Brand: NIPRO HYPODERMIC NEEDLE

## (undated) DEVICE — NEEDLE HYPO 18GA L1.5IN PNK POLYPR HUB S STL REG BVL STR

## (undated) DEVICE — 3 ML SYRINGE LUER-LOCK TIP: Brand: MONOJECT

## (undated) DEVICE — NEEDLE SPNL 25GA L2IN BLU SHT NEO LUM PUNC DISP

## (undated) DEVICE — Z DISCONTINUED APPLICATOR SURG PREP 0.35OZ 2% CHG 70% ISO ALC W/ HI LT

## (undated) DEVICE — TOWEL,OR,DSP,ST,BLUE,STD,6/PK,12PK/CS: Brand: MEDLINE